# Patient Record
Sex: MALE | Race: BLACK OR AFRICAN AMERICAN | NOT HISPANIC OR LATINO | URBAN - METROPOLITAN AREA
[De-identification: names, ages, dates, MRNs, and addresses within clinical notes are randomized per-mention and may not be internally consistent; named-entity substitution may affect disease eponyms.]

---

## 2017-06-19 ENCOUNTER — INPATIENT (INPATIENT)
Facility: HOSPITAL | Age: 62
LOS: 4 days | Discharge: HOME | DRG: 287 | End: 2017-06-24
Attending: HOSPITALIST | Admitting: HOSPITALIST
Payer: COMMERCIAL

## 2017-06-19 VITALS
TEMPERATURE: 98 F | DIASTOLIC BLOOD PRESSURE: 65 MMHG | RESPIRATION RATE: 20 BRPM | HEIGHT: 69 IN | OXYGEN SATURATION: 100 % | HEART RATE: 109 BPM | WEIGHT: 162.92 LBS | SYSTOLIC BLOOD PRESSURE: 103 MMHG

## 2017-06-19 DIAGNOSIS — I48.91 UNSPECIFIED ATRIAL FIBRILLATION: ICD-10-CM

## 2017-06-19 DIAGNOSIS — I47.2 VENTRICULAR TACHYCARDIA: ICD-10-CM

## 2017-06-19 LAB
ALBUMIN SERPL ELPH-MCNC: 2.4 G/DL — LOW (ref 3.3–5.2)
ALP SERPL-CCNC: 38 U/L — LOW (ref 40–120)
ALT FLD-CCNC: 28 U/L — SIGNIFICANT CHANGE UP
ANION GAP SERPL CALC-SCNC: 10 MMOL/L — SIGNIFICANT CHANGE UP (ref 5–17)
ANISOCYTOSIS BLD QL: SLIGHT — SIGNIFICANT CHANGE UP
APTT BLD: 26.9 SEC — LOW (ref 27.5–37.4)
AST SERPL-CCNC: 16 U/L — SIGNIFICANT CHANGE UP
BILIRUB SERPL-MCNC: 0.3 MG/DL — LOW (ref 0.4–2)
BUN SERPL-MCNC: 15 MG/DL — SIGNIFICANT CHANGE UP (ref 8–20)
CALCIUM SERPL-MCNC: 7.5 MG/DL — LOW (ref 8.6–10.2)
CHLORIDE SERPL-SCNC: 98 MMOL/L — SIGNIFICANT CHANGE UP (ref 98–107)
CO2 SERPL-SCNC: 24 MMOL/L — SIGNIFICANT CHANGE UP (ref 22–29)
CREAT SERPL-MCNC: 0.88 MG/DL — SIGNIFICANT CHANGE UP (ref 0.5–1.3)
D DIMER BLD IA.RAPID-MCNC: 190 NG/ML DDU — SIGNIFICANT CHANGE UP
EOSINOPHIL NFR BLD AUTO: 3 % — SIGNIFICANT CHANGE UP (ref 0–6)
GLUCOSE SERPL-MCNC: 106 MG/DL — SIGNIFICANT CHANGE UP (ref 70–115)
HCT VFR BLD CALC: 29 % — LOW (ref 42–52)
HGB BLD-MCNC: 10.1 G/DL — LOW (ref 14–18)
HYPOCHROMIA BLD QL: SLIGHT — SIGNIFICANT CHANGE UP
INR BLD: 1.25 RATIO — HIGH (ref 0.88–1.16)
LYMPHOCYTES # BLD AUTO: 19 % — LOW (ref 20–55)
MACROCYTES BLD QL: SLIGHT — SIGNIFICANT CHANGE UP
MCHC RBC-ENTMCNC: 30.9 PG — SIGNIFICANT CHANGE UP (ref 27–31)
MCHC RBC-ENTMCNC: 34.8 G/DL — SIGNIFICANT CHANGE UP (ref 32–36)
MCV RBC AUTO: 88.7 FL — SIGNIFICANT CHANGE UP (ref 80–94)
MONOCYTES NFR BLD AUTO: 13 % — HIGH (ref 3–10)
NEUTROPHILS NFR BLD AUTO: 63 % — SIGNIFICANT CHANGE UP (ref 37–73)
NEUTS BAND # BLD: 2 % — SIGNIFICANT CHANGE UP (ref 0–8)
NT-PROBNP SERPL-SCNC: 1191 PG/ML — HIGH (ref 0–300)
PLAT MORPH BLD: NORMAL — SIGNIFICANT CHANGE UP
PLATELET # BLD AUTO: 78 K/UL — LOW (ref 150–400)
POIKILOCYTOSIS BLD QL AUTO: SLIGHT — SIGNIFICANT CHANGE UP
POTASSIUM SERPL-MCNC: 3.7 MMOL/L — SIGNIFICANT CHANGE UP (ref 3.5–5.3)
POTASSIUM SERPL-SCNC: 3.7 MMOL/L — SIGNIFICANT CHANGE UP (ref 3.5–5.3)
PROT SERPL-MCNC: 7.4 G/DL — SIGNIFICANT CHANGE UP (ref 6.6–8.7)
PROTHROM AB SERPL-ACNC: 13.8 SEC — HIGH (ref 9.8–12.7)
RBC # BLD: 3.27 M/UL — LOW (ref 4.6–6.2)
RBC # FLD: 16.6 % — HIGH (ref 11–15.6)
RBC BLD AUTO: ABNORMAL
SMUDGE CELLS # BLD: PRESENT — SIGNIFICANT CHANGE UP
SODIUM SERPL-SCNC: 132 MMOL/L — LOW (ref 135–145)
T4 AB SER-ACNC: 5.8 UG/DL — SIGNIFICANT CHANGE UP (ref 4.5–12)
TARGETS BLD QL SMEAR: SLIGHT — SIGNIFICANT CHANGE UP
TROPONIN T SERPL-MCNC: 0.12 NG/ML — HIGH (ref 0–0.06)
TROPONIN T SERPL-MCNC: 0.15 NG/ML — HIGH (ref 0–0.06)
TSH SERPL-MCNC: 2.91 UIU/ML — SIGNIFICANT CHANGE UP (ref 0.27–4.2)
WBC # BLD: 4.2 K/UL — LOW (ref 4.8–10.8)
WBC # FLD AUTO: 4.2 K/UL — LOW (ref 4.8–10.8)

## 2017-06-19 PROCEDURE — 93320 DOPPLER ECHO COMPLETE: CPT | Mod: 26

## 2017-06-19 PROCEDURE — 93010 ELECTROCARDIOGRAM REPORT: CPT

## 2017-06-19 PROCEDURE — 71010: CPT | Mod: 26

## 2017-06-19 PROCEDURE — 99291 CRITICAL CARE FIRST HOUR: CPT

## 2017-06-19 PROCEDURE — 99223 1ST HOSP IP/OBS HIGH 75: CPT

## 2017-06-19 PROCEDURE — 76376 3D RENDER W/INTRP POSTPROCES: CPT | Mod: 26

## 2017-06-19 PROCEDURE — 93312 ECHO TRANSESOPHAGEAL: CPT | Mod: 26

## 2017-06-19 PROCEDURE — 93325 DOPPLER ECHO COLOR FLOW MAPG: CPT | Mod: 26

## 2017-06-19 RX ORDER — GLUCAGON INJECTION, SOLUTION 0.5 MG/.1ML
1 INJECTION, SOLUTION SUBCUTANEOUS ONCE
Qty: 0 | Refills: 0 | Status: DISCONTINUED | OUTPATIENT
Start: 2017-06-19 | End: 2017-06-22

## 2017-06-19 RX ORDER — ENOXAPARIN SODIUM 100 MG/ML
70 INJECTION SUBCUTANEOUS ONCE
Qty: 0 | Refills: 0 | Status: COMPLETED | OUTPATIENT
Start: 2017-06-19 | End: 2017-06-19

## 2017-06-19 RX ORDER — WARFARIN SODIUM 2.5 MG/1
5 TABLET ORAL ONCE
Qty: 0 | Refills: 0 | Status: COMPLETED | OUTPATIENT
Start: 2017-06-19 | End: 2017-06-19

## 2017-06-19 RX ORDER — PANTOPRAZOLE SODIUM 20 MG/1
40 TABLET, DELAYED RELEASE ORAL
Qty: 0 | Refills: 0 | Status: DISCONTINUED | OUTPATIENT
Start: 2017-06-19 | End: 2017-06-24

## 2017-06-19 RX ORDER — POLYETHYLENE GLYCOL 3350 17 G/17G
17 POWDER, FOR SOLUTION ORAL DAILY
Qty: 0 | Refills: 0 | Status: DISCONTINUED | OUTPATIENT
Start: 2017-06-19 | End: 2017-06-24

## 2017-06-19 RX ORDER — DEXTROSE 50 % IN WATER 50 %
25 SYRINGE (ML) INTRAVENOUS ONCE
Qty: 0 | Refills: 0 | Status: DISCONTINUED | OUTPATIENT
Start: 2017-06-19 | End: 2017-06-22

## 2017-06-19 RX ORDER — SODIUM CHLORIDE 9 MG/ML
500 INJECTION INTRAMUSCULAR; INTRAVENOUS; SUBCUTANEOUS ONCE
Qty: 0 | Refills: 0 | Status: COMPLETED | OUTPATIENT
Start: 2017-06-19 | End: 2017-06-19

## 2017-06-19 RX ORDER — DEXTROSE 50 % IN WATER 50 %
1 SYRINGE (ML) INTRAVENOUS ONCE
Qty: 0 | Refills: 0 | Status: DISCONTINUED | OUTPATIENT
Start: 2017-06-19 | End: 2017-06-22

## 2017-06-19 RX ORDER — ACETAMINOPHEN 500 MG
650 TABLET ORAL ONCE
Qty: 0 | Refills: 0 | Status: COMPLETED | OUTPATIENT
Start: 2017-06-19 | End: 2017-06-19

## 2017-06-19 RX ORDER — DILTIAZEM HCL 120 MG
120 CAPSULE, EXT RELEASE 24 HR ORAL DAILY
Qty: 0 | Refills: 0 | Status: DISCONTINUED | OUTPATIENT
Start: 2017-06-19 | End: 2017-06-24

## 2017-06-19 RX ORDER — DILTIAZEM HCL 120 MG
10 CAPSULE, EXT RELEASE 24 HR ORAL
Qty: 125 | Refills: 0 | Status: DISCONTINUED | OUTPATIENT
Start: 2017-06-19 | End: 2017-06-19

## 2017-06-19 RX ORDER — HYDROCHLOROTHIAZIDE 25 MG
0 TABLET ORAL
Qty: 0 | Refills: 0 | COMMUNITY

## 2017-06-19 RX ORDER — PYRIDOXINE HCL (VITAMIN B6) 100 MG
50 TABLET ORAL DAILY
Qty: 0 | Refills: 0 | Status: DISCONTINUED | OUTPATIENT
Start: 2017-06-19 | End: 2017-06-24

## 2017-06-19 RX ORDER — METFORMIN HYDROCHLORIDE 850 MG/1
0 TABLET ORAL
Qty: 0 | Refills: 0 | COMMUNITY

## 2017-06-19 RX ORDER — LENALIDOMIDE 5 MG/1
0 CAPSULE ORAL
Qty: 0 | Refills: 0 | COMMUNITY

## 2017-06-19 RX ORDER — DEXTROSE 50 % IN WATER 50 %
12.5 SYRINGE (ML) INTRAVENOUS ONCE
Qty: 0 | Refills: 0 | Status: DISCONTINUED | OUTPATIENT
Start: 2017-06-19 | End: 2017-06-22

## 2017-06-19 RX ORDER — ENOXAPARIN SODIUM 100 MG/ML
80 INJECTION SUBCUTANEOUS ONCE
Qty: 0 | Refills: 0 | Status: DISCONTINUED | OUTPATIENT
Start: 2017-06-19 | End: 2017-06-19

## 2017-06-19 RX ORDER — SODIUM CHLORIDE 9 MG/ML
1000 INJECTION, SOLUTION INTRAVENOUS
Qty: 0 | Refills: 0 | Status: DISCONTINUED | OUTPATIENT
Start: 2017-06-19 | End: 2017-06-22

## 2017-06-19 RX ORDER — FUROSEMIDE 40 MG
20 TABLET ORAL ONCE
Qty: 0 | Refills: 0 | Status: COMPLETED | OUTPATIENT
Start: 2017-06-19 | End: 2017-06-19

## 2017-06-19 RX ORDER — DOCUSATE SODIUM 100 MG
100 CAPSULE ORAL THREE TIMES A DAY
Qty: 0 | Refills: 0 | Status: DISCONTINUED | OUTPATIENT
Start: 2017-06-19 | End: 2017-06-24

## 2017-06-19 RX ORDER — LOSARTAN POTASSIUM 100 MG/1
0 TABLET, FILM COATED ORAL
Qty: 0 | Refills: 0 | COMMUNITY

## 2017-06-19 RX ORDER — ENOXAPARIN SODIUM 100 MG/ML
70 INJECTION SUBCUTANEOUS EVERY 12 HOURS
Qty: 0 | Refills: 0 | Status: DISCONTINUED | OUTPATIENT
Start: 2017-06-19 | End: 2017-06-24

## 2017-06-19 RX ORDER — LOSARTAN POTASSIUM 100 MG/1
100 TABLET, FILM COATED ORAL DAILY
Qty: 0 | Refills: 0 | Status: DISCONTINUED | OUTPATIENT
Start: 2017-06-19 | End: 2017-06-24

## 2017-06-19 RX ORDER — INSULIN LISPRO 100/ML
VIAL (ML) SUBCUTANEOUS
Qty: 0 | Refills: 0 | Status: DISCONTINUED | OUTPATIENT
Start: 2017-06-19 | End: 2017-06-22

## 2017-06-19 RX ORDER — DILTIAZEM HCL 120 MG
5 CAPSULE, EXT RELEASE 24 HR ORAL
Qty: 125 | Refills: 0 | Status: DISCONTINUED | OUTPATIENT
Start: 2017-06-19 | End: 2017-06-19

## 2017-06-19 RX ORDER — SODIUM CHLORIDE 9 MG/ML
3 INJECTION INTRAMUSCULAR; INTRAVENOUS; SUBCUTANEOUS ONCE
Qty: 0 | Refills: 0 | Status: COMPLETED | OUTPATIENT
Start: 2017-06-19 | End: 2017-06-19

## 2017-06-19 RX ADMIN — Medication 650 MILLIGRAM(S): at 03:28

## 2017-06-19 RX ADMIN — SODIUM CHLORIDE 3 MILLILITER(S): 9 INJECTION INTRAMUSCULAR; INTRAVENOUS; SUBCUTANEOUS at 03:28

## 2017-06-19 RX ADMIN — Medication 20 MILLIGRAM(S): at 09:16

## 2017-06-19 RX ADMIN — ENOXAPARIN SODIUM 70 MILLIGRAM(S): 100 INJECTION SUBCUTANEOUS at 05:27

## 2017-06-19 RX ADMIN — ENOXAPARIN SODIUM 70 MILLIGRAM(S): 100 INJECTION SUBCUTANEOUS at 18:13

## 2017-06-19 RX ADMIN — Medication 50 MILLIGRAM(S): at 18:13

## 2017-06-19 RX ADMIN — WARFARIN SODIUM 5 MILLIGRAM(S): 2.5 TABLET ORAL at 21:38

## 2017-06-19 RX ADMIN — Medication: at 14:04

## 2017-06-19 RX ADMIN — Medication 5 MG/HR: at 05:39

## 2017-06-19 RX ADMIN — Medication 100 MILLIGRAM(S): at 21:38

## 2017-06-19 RX ADMIN — Medication 5 MG/HR: at 07:26

## 2017-06-19 RX ADMIN — Medication 10 MG/HR: at 09:12

## 2017-06-19 RX ADMIN — SODIUM CHLORIDE 500 MILLILITER(S): 9 INJECTION INTRAMUSCULAR; INTRAVENOUS; SUBCUTANEOUS at 03:33

## 2017-06-19 NOTE — ED PROVIDER NOTE - OBJECTIVE STATEMENT
A 63 yo M with a Hx of HTN and multiple myeloma complaining of palpitations x 1 day. Recent air travel from [place] to [place], followed by long drive to [place] and notes increased SOB. He began his first round of chemotherapy 14 days ago and is on coumadin prophylactically. No hx of DVT or PE. Pt denies hx of arrythmia. Other medications include decadron and glucagon in addition to chemotherapy medications. He reports associated nausea and states his mouth was quivering this morning. Pt denies vomiting, diarrhea, fevers, chills. NKDA.

## 2017-06-19 NOTE — ED PROVIDER NOTE - ATTENDING CONTRIBUTION TO CARE
I, Vasquez Isbell, performed the initial face to face bedside interview with this patient regarding history of present illness, review of symptoms and relevant past medical, social and family history.  I completed an independent physical examination.  I was the initial provider who evaluated this patient. I have signed out the follow up of any pending tests (i.e. labs, radiological studies) to the ACP.  I have communicated the patient’s plan of care and disposition with the ACP.  The history, relevant review of systems, past medical and surgical history, medical decision making, and physical examination was documented by the scribe in my presence and I attest to the accuracy of the documentation. I, Vasquez Isbell, performed the initial face to face bedside interview with this patient regarding history of present illness, review of symptoms and relevant past medical, social and family history.  I completed an independent physical examination.  I was the initial provider who evaluated this patient.    The history, relevant review of systems, past medical and surgical history, medical decision making, and physical examination was documented by the scribe in my presence and I attest to the accuracy of the documentation.

## 2017-06-19 NOTE — ED ADULT NURSE REASSESSMENT NOTE - NS ED NURSE REASSESS COMMENT FT1
pt c/o lightness and not feeling well, just after someone lifted up his bed so that he could eat,+ frequent yawning noted with jaw trembling / shaking noted. " I don't feel so good". v.s.s. f.s. 96mg/dl, apple juice provided, episode last about 1 minute the pt reports he feels good , tolerated lunch tray well. denies any other compliants at thias time.

## 2017-06-19 NOTE — ED ADULT NURSE REASSESSMENT NOTE - NS ED NURSE REASSESS COMMENT FT1
Patient A&OX3, back from DAREN. c/o mild right chest pain. VSS. CM in place. NSR. Safety Maintained. Patient A&OX3, back from DAREN and cardioversion. c/o mild right chest pain at this time. VSS. CM in place. NSR. Safety Maintained.

## 2017-06-19 NOTE — ED ADULT NURSE REASSESSMENT NOTE - NS ED NURSE REASSESS COMMENT FT1
spoke to Dr. Boogie, cardizem gtt discontinued at this time .to continue on po cardizem in am, DASH diet ordered, pt reports feeling better at this time, family updated on plan of care and questions answered.

## 2017-06-19 NOTE — CONSULT NOTE ADULT - PROBLEM SELECTOR RECOMMENDATION 2
Concern for underlying CM. For now, no changes in meds. Will add BNP to labs. Pulmonary congestion on chest X-ray. Will give lasix 20 mg IV.

## 2017-06-19 NOTE — ED ADULT NURSE REASSESSMENT NOTE - NS ED NURSE REASSESS COMMENT FT1
Patient received at 0700; awake; alert and oriented x4. c/o mild right chest pain at this time. Denies SOB, dizziness, and palpitation. No distress noted. VSS. Clear BBS. abd soft, nondistended and nontender. moving all extremities well. Respirations unlabored. Report received at bedside. Cardiac monitor in place. NSR. Call bell and personal items in reach. Continue to monitor patient and maintain safety. Patient received at 0700; awake; alert and oriented x4. c/o mild right chest pain at this time. Denies SOB, dizziness, and palpitation. No distress noted. VSS. Clear BBS. abd soft, nondistended and nontender. moving all extremities well. Respirations unlabored. Report received at bedside. Cardiac monitor in place. A fib on monitor. Call bell and personal items in reach. Continue to monitor patient and maintain safety.

## 2017-06-19 NOTE — H&P ADULT - ASSESSMENT
Patient is a  61y Male with history of hypertension, MM presenting with chest pain and palpitations. Patient has had difficulty sleeping at night. Presents with chest pain, right sided, no associated n/v/d.   Previous angiogram 5 years prior noted to be normal per patient with no PCI/CABG.   Palpitations- noted to be in afib with RVR. New onset. Has been on coumadin recently. 61y Male with history of hypertension, MM (on revlimid & coumadin x 14 days) presenting with chest pain, SOB and palpitations. Patient has had difficulty sleeping at night due to palp. Presents with chest pain, right sided, no associated n/v/d. Last night had left sided CP. Denies light headedness/dizziness, fevers/chills, abdominal pain, n/v, diarrhea/constipation, dysuria or increased urinary frequency. Previous angiogram 5 years prior noted to be normal per patient with no PCI/CABG. Noted to be in New onset afib with RVR. Pt didnt revert after 3 doses of  cardizem IVP, was started on Cardizem gtt & given Lovenox x 1 dose. INR 1.2, last INR check was 10 days ago, unsure of the result. Going for CV today    New onset afib with RVR- ?revlimid side effect was given cardizem 30 PO x 1, c/w cardizem gtt with titration per HR, mild congestion on CXR, F/U BNP, Echo, TnI x 3, NPO for CV today, c/w AC with bridging, TSH wnl  NSVT- r/o CM- f/u BNP, Echo Pulmonary congestion on chest X-ray, lasix 20 mg IV x 1 dose given  MM- revlimid last dose today after which to be started after 14 days.   HTN- c/w losartan, HCTZ  DM- hold metformin/ glimeperide, ISS, f/u FS  GERD- c/w PPI  DVT ppx on AC

## 2017-06-19 NOTE — ED ADULT TRIAGE NOTE - CHIEF COMPLAINT QUOTE
pt BIBA with complaints of palpations since yesterday, chest pain on and off with shortness of breath unable to sleep.

## 2017-06-19 NOTE — CONSULT NOTE ADULT - SUBJECTIVE AND OBJECTIVE BOX
[place] [facility]                                                        Office: [address] [address]                                                       Telephone: [phone]. Fax:[phone]      CC: Palpitations, shortness of breath    HPI: Patient is a  61y Male with history of hypertension, MM presenting with chest pain and palpitations. Patient has had difficulty sleeping at night. Presents with chest pain, right sided, no associated n/v/d.   Previous angiogram 5 years prior noted to be normal per patient with no PCI/CABG.   Palpitations- noted to be in afib with RVR. New onset. Has been on coumadin recently.     PAST MEDICAL & SURGICAL HISTORY:  HTN (hypertension)  No significant past surgical history    FAMILY HISTORY:none.     SOCIAL HISTORY: no EtOH, drugs or tobacco    MEDICATIONS  (STANDING):  diltiazem Infusion 5mG/Hr IV Continuous <Continuous>    ROS: All others negative    PHYSICAL EXAM:  Vital Signs Last 24 Hrs  T(C): 36.9, Max: 36.9 (06-19 @ 02:35)  T(F): 98.5, Max: 98.5 (06-19 @ 02:35)  HR: 86 (72 - 149)  BP: 117/74 (103/59 - 132/60)  BP(mean): --  RR: 20 (19 - 20)  SpO2: 97% (96% - 100%)  I&O's Summary    Appearance: Normal	  HEENT:   Normal oral mucosa, PERRL, EOMI	  Lymphatic: No lymphadenopathy  Cardiovascular: Normal S1 S2, No JVD, No murmurs, No edema  Respiratory: Lungs clear to auscultation	  Psychiatry: A & O x 3, Mood & affect appropriate  Gastrointestinal:  Soft, Non-tender, + BS	  Skin: No rashes, No ecchymoses, No cyanosis  Neurologic: Non-focal  Extremities: Normal range of motion, No clubbing, cyanosis or edema  Vascular: Peripheral pulses palpable 2+ bilaterally    ECG: afib with RVR. PVCs. Tele NSVT.   LABS:                        10.1   4.2   )-----------( 78       ( 19 Jun 2017 03:28 )             29.0     06-19    132<L>  |  98  |  15.0  ----------------------------<  106  3.7   |  24.0  |  0.88    Ca    7.5<L>      19 Jun 2017 05:14    TPro  7.4  /  Alb  2.4<L>  /  TBili  0.3<L>  /  DBili  x   /  AST  16  /  ALT  28  /  AlkPhos  38<L>  06-19    PT/INR - ( 19 Jun 2017 03:28 )   PT: 13.8 sec;   INR: 1.25 ratio         PTT - ( 19 Jun 2017 03:28 )  PTT:26.9 sec  CARDIAC MARKERS ( 19 Jun 2017 05:14 )  x     / 0.12 ng/mL / x     / x     / x          RADIOLOGY & ADDITIONAL STUDIES:

## 2017-06-19 NOTE — ED PROVIDER NOTE - MEDICAL DECISION MAKING DETAILS
Will check labs, CXR, Tylenol, IV bolus. If no improvement, will give Cardizem and consider ACS vs PE

## 2017-06-19 NOTE — PROGRESS NOTE ADULT - SUBJECTIVE AND OBJECTIVE BOX
TRANSESOPHAGEAL ECHOCARDIOGRAM     After risks and benefits of procedures were explained, informed consent was obtained and placed in chart.   The patient received topical anesthestic to the oropharynx with viscous lidocaine and benzocaine spray.  Refer to Anesthesia note for sedation details.  The DAREN probe was passed into the esophagus without difficulty.  Transesophageal and transgastric images were obtained.  The DAREN probe was removed without difficulty and examined.  There was no evidence for bleeding.  The patient tolerated the procedure well without any immediate DAREN-related complications.      Preliminary Findings:  No cardiac mass, vegetations, thrombus or shunts visualized.   No spontaneous echo contrast or thrombus in the LA/CARIDAD/RA/RAA.    CARIDAD systolic empyting velocities were normal  Overall LV systolic function was normal. Estimated LVEF =55-60%  RV systolic function was normal.  No significant valvular abnormality.    No pericardial effusion.   There was mild, non-mobile atheroma seen in the thoracic aorta.     Patient successfully converted to sinus rhythm with synchronized  150 J of direct current cardioversion. Now in sinus rhythm 60s.  Diltiazem gtt decreased.     Reinforced importance of compliance with anticoagulation with patient.      Final report to follow.

## 2017-06-19 NOTE — CONSULT NOTE ADULT - PROBLEM SELECTOR RECOMMENDATION 9
Difficult to rate control. Plan to increase cardizem to 10 mg. Given Lovenox this am. Plan for DAREN/CV today.   NPO.

## 2017-06-19 NOTE — ED PROVIDER NOTE - ENMT, MLM
Airway patent, Nasal mucosa clear. Parched mucous membranes. Throat has no vesicles, no oropharyngeal exudates and uvula is midline.

## 2017-06-19 NOTE — ED ADULT NURSE NOTE - OBJECTIVE STATEMENT
Received patient in B7L. A&ox3 , able to make needs known. As per patient, He started having chest pain and palpitations since yesterday. Patient states this is a side effect of a medication he is taking.

## 2017-06-20 DIAGNOSIS — I10 ESSENTIAL (PRIMARY) HYPERTENSION: ICD-10-CM

## 2017-06-20 LAB
ANION GAP SERPL CALC-SCNC: 11 MMOL/L — SIGNIFICANT CHANGE UP (ref 5–17)
ANISOCYTOSIS BLD QL: SLIGHT — SIGNIFICANT CHANGE UP
APTT BLD: 31.3 SEC — SIGNIFICANT CHANGE UP (ref 27.5–37.4)
BUN SERPL-MCNC: 15 MG/DL — SIGNIFICANT CHANGE UP (ref 8–20)
CALCIUM SERPL-MCNC: 7.2 MG/DL — LOW (ref 8.6–10.2)
CHLORIDE SERPL-SCNC: 100 MMOL/L — SIGNIFICANT CHANGE UP (ref 98–107)
CO2 SERPL-SCNC: 25 MMOL/L — SIGNIFICANT CHANGE UP (ref 22–29)
CREAT SERPL-MCNC: 0.93 MG/DL — SIGNIFICANT CHANGE UP (ref 0.5–1.3)
EOSINOPHIL NFR BLD AUTO: 4 % — SIGNIFICANT CHANGE UP (ref 0–6)
GLUCOSE SERPL-MCNC: 96 MG/DL — SIGNIFICANT CHANGE UP (ref 70–115)
HBA1C BLD-MCNC: 5.4 % — SIGNIFICANT CHANGE UP (ref 4–5.6)
HCT VFR BLD CALC: 27.5 % — LOW (ref 42–52)
HGB BLD-MCNC: 9.5 G/DL — LOW (ref 14–18)
HYPOCHROMIA BLD QL: SLIGHT — SIGNIFICANT CHANGE UP
INR BLD: 1.3 RATIO — HIGH (ref 0.88–1.16)
LYMPHOCYTES # BLD AUTO: 20 % — SIGNIFICANT CHANGE UP (ref 20–55)
MACROCYTES BLD QL: SLIGHT — SIGNIFICANT CHANGE UP
MAGNESIUM SERPL-MCNC: 1.9 MG/DL — SIGNIFICANT CHANGE UP (ref 1.6–2.6)
MCHC RBC-ENTMCNC: 31.1 PG — HIGH (ref 27–31)
MCHC RBC-ENTMCNC: 34.5 G/DL — SIGNIFICANT CHANGE UP (ref 32–36)
MCV RBC AUTO: 90.2 FL — SIGNIFICANT CHANGE UP (ref 80–94)
MONOCYTES NFR BLD AUTO: 18 % — HIGH (ref 3–10)
NEUTROPHILS NFR BLD AUTO: 56 % — SIGNIFICANT CHANGE UP (ref 37–73)
PHOSPHATE SERPL-MCNC: 2.6 MG/DL — SIGNIFICANT CHANGE UP (ref 2.4–4.7)
PLAT MORPH BLD: NORMAL — SIGNIFICANT CHANGE UP
PLATELET # BLD AUTO: 89 K/UL — LOW (ref 150–400)
POIKILOCYTOSIS BLD QL AUTO: SLIGHT — SIGNIFICANT CHANGE UP
POTASSIUM SERPL-MCNC: 3.4 MMOL/L — LOW (ref 3.5–5.3)
POTASSIUM SERPL-SCNC: 3.4 MMOL/L — LOW (ref 3.5–5.3)
PROTHROM AB SERPL-ACNC: 14.4 SEC — HIGH (ref 9.8–12.7)
RBC # BLD: 3.05 M/UL — LOW (ref 4.6–6.2)
RBC # FLD: 16.7 % — HIGH (ref 11–15.6)
RBC BLD AUTO: ABNORMAL
SODIUM SERPL-SCNC: 136 MMOL/L — SIGNIFICANT CHANGE UP (ref 135–145)
TROPONIN T SERPL-MCNC: 0.18 NG/ML — HIGH (ref 0–0.06)
VARIANT LYMPHS # BLD: 2 % — SIGNIFICANT CHANGE UP (ref 0–6)
WBC # BLD: 3.2 K/UL — LOW (ref 4.8–10.8)
WBC # FLD AUTO: 3.2 K/UL — LOW (ref 4.8–10.8)

## 2017-06-20 PROCEDURE — 99233 SBSQ HOSP IP/OBS HIGH 50: CPT

## 2017-06-20 PROCEDURE — 93010 ELECTROCARDIOGRAM REPORT: CPT

## 2017-06-20 RX ORDER — FUROSEMIDE 40 MG
20 TABLET ORAL ONCE
Qty: 0 | Refills: 0 | Status: COMPLETED | OUTPATIENT
Start: 2017-06-20 | End: 2017-06-20

## 2017-06-20 RX ORDER — AMIODARONE HYDROCHLORIDE 400 MG/1
1 TABLET ORAL
Qty: 900 | Refills: 0 | Status: DISCONTINUED | OUTPATIENT
Start: 2017-06-20 | End: 2017-06-21

## 2017-06-20 RX ORDER — POTASSIUM CHLORIDE 20 MEQ
40 PACKET (EA) ORAL ONCE
Qty: 0 | Refills: 0 | Status: COMPLETED | OUTPATIENT
Start: 2017-06-20 | End: 2017-06-20

## 2017-06-20 RX ORDER — AMIODARONE HYDROCHLORIDE 400 MG/1
0.03 TABLET ORAL
Qty: 900 | Refills: 0 | Status: DISCONTINUED | OUTPATIENT
Start: 2017-06-20 | End: 2017-06-20

## 2017-06-20 RX ORDER — AMIODARONE HYDROCHLORIDE 400 MG/1
0.5 TABLET ORAL
Qty: 900 | Refills: 0 | Status: DISCONTINUED | OUTPATIENT
Start: 2017-06-20 | End: 2017-06-21

## 2017-06-20 RX ORDER — AMIODARONE HYDROCHLORIDE 400 MG/1
0.01 TABLET ORAL
Qty: 900 | Refills: 0 | Status: DISCONTINUED | OUTPATIENT
Start: 2017-06-20 | End: 2017-06-20

## 2017-06-20 RX ORDER — AMIODARONE HYDROCHLORIDE 400 MG/1
150 TABLET ORAL ONCE
Qty: 0 | Refills: 0 | Status: COMPLETED | OUTPATIENT
Start: 2017-06-20 | End: 2017-06-20

## 2017-06-20 RX ORDER — WARFARIN SODIUM 2.5 MG/1
5 TABLET ORAL ONCE
Qty: 0 | Refills: 0 | Status: COMPLETED | OUTPATIENT
Start: 2017-06-20 | End: 2017-06-20

## 2017-06-20 RX ADMIN — PANTOPRAZOLE SODIUM 40 MILLIGRAM(S): 20 TABLET, DELAYED RELEASE ORAL at 06:01

## 2017-06-20 RX ADMIN — LOSARTAN POTASSIUM 100 MILLIGRAM(S): 100 TABLET, FILM COATED ORAL at 06:01

## 2017-06-20 RX ADMIN — AMIODARONE HYDROCHLORIDE 16.67 MG/MIN: 400 TABLET ORAL at 21:57

## 2017-06-20 RX ADMIN — POLYETHYLENE GLYCOL 3350 17 GRAM(S): 17 POWDER, FOR SOLUTION ORAL at 12:23

## 2017-06-20 RX ADMIN — AMIODARONE HYDROCHLORIDE 618 MILLIGRAM(S): 400 TABLET ORAL at 15:48

## 2017-06-20 RX ADMIN — Medication 50 MILLIGRAM(S): at 12:23

## 2017-06-20 RX ADMIN — Medication 120 MILLIGRAM(S): at 06:01

## 2017-06-20 RX ADMIN — ENOXAPARIN SODIUM 70 MILLIGRAM(S): 100 INJECTION SUBCUTANEOUS at 06:00

## 2017-06-20 RX ADMIN — Medication 40 MILLIEQUIVALENT(S): at 15:26

## 2017-06-20 RX ADMIN — WARFARIN SODIUM 5 MILLIGRAM(S): 2.5 TABLET ORAL at 23:12

## 2017-06-20 RX ADMIN — ENOXAPARIN SODIUM 70 MILLIGRAM(S): 100 INJECTION SUBCUTANEOUS at 18:09

## 2017-06-20 RX ADMIN — AMIODARONE HYDROCHLORIDE 33.33 MG/MIN: 400 TABLET ORAL at 16:12

## 2017-06-20 NOTE — PROGRESS NOTE ADULT - SUBJECTIVE AND OBJECTIVE BOX
CHIEF COMPLAINT/INTERVAL HISTORY:    Patient is a 61y old  Male who presents with a chief complaint of palpitations (19 Jun 2017 09:26)      HPI:  61y Male with history of hypertension, MM (on revlimid & coumadin x 14 days) presenting with chest pain, SOB and palpitations. Patient has had difficulty sleeping at night due to palp. Presents with chest pain, right sided, no associated n/v/d. Last night had left sided CP. Denies light headedness/dizziness, fevers/chills, abdominal pain, n/v, diarrhea/constipation, dysuria or increased urinary frequency. Previous angiogram 5 years prior noted to be normal per patient with no PCI/CABG. Noted to be in New onset afib with RVR. Pt was started on Cardizem gtt & given Lovenox x 1 dose. INR 1.2, last INR check was 10 days ago, unsure of the result. Going for CV today      Hemonc: Dr. Aman Hummel (19 Jun 2017 09:26)      SUBJECTIVE & OBJECTIVE: Pt seen and examined at bedside. COnverted to Afib with RVR at 160's. IV Cardizem given with not much improvement Amio load per cardio. c/o intermittent chest pain & SOB    ICU Vital Signs Last 24 Hrs  T(C): 37.6, Max: 37.6 (06-20 @ 15:28)  T(F): 99.6, Max: 99.6 (06-20 @ 15:28)  HR: 120 (69 - 130)  BP: 114/64 (101/56 - 125/69)  BP(mean): --  ABP: --  ABP(mean): --  RR: 15 (14 - 18)  SpO2: 99% (96% - 99%)        MEDICATIONS  (STANDING):  enoxaparin Injectable 70milliGRAM(s) SubCutaneous every 12 hours  losartan 100milliGRAM(s) Oral daily  hydrochlorothiazide   Tablet 25milliGRAM(s) Oral daily  pyridoxine 50milliGRAM(s) Oral daily  pantoprazole    Tablet 40milliGRAM(s) Oral before breakfast  insulin lispro (HumaLOG) corrective regimen sliding scale  SubCutaneous three times a day before meals  dextrose 5%. 1000milliLiter(s) IV Continuous <Continuous>  dextrose 50% Injectable 12.5Gram(s) IV Push once  dextrose 50% Injectable 25Gram(s) IV Push once  dextrose 50% Injectable 25Gram(s) IV Push once  docusate sodium 100milliGRAM(s) Oral three times a day  polyethylene glycol 3350 17Gram(s) Oral daily  diltiazem   CD 120milliGRAM(s) Oral daily  warfarin 5milliGRAM(s) Oral once  amiodarone IVPB 150milliGRAM(s) IV Intermittent once  amiodarone Infusion 1mG/Min IV Continuous <Continuous>  amiodarone Infusion 0.5mG/Min IV Continuous <Continuous>    MEDICATIONS  (PRN):  dextrose Gel 1Dose(s) Oral once PRN Blood Glucose LESS THAN 70 milliGRAM(s)/deciliter  glucagon  Injectable 1milliGRAM(s) IntraMuscular once PRN Glucose LESS THAN 70 milligrams/deciliter      LABS:                        9.5    3.2   )-----------( 89       ( 20 Jun 2017 02:38 )             27.5     06-20    136  |  100  |  15.0  ----------------------------<  96  3.4<L>   |  25.0  |  0.93    Ca    7.2<L>      20 Jun 2017 02:38  Phos  2.6     06-20  Mg     1.9     06-20    TPro  7.4  /  Alb  2.4<L>  /  TBili  0.3<L>  /  DBili  x   /  AST  16  /  ALT  28  /  AlkPhos  38<L>  06-19    PT/INR - ( 20 Jun 2017 02:38 )   PT: 14.4 sec;   INR: 1.30 ratio         PTT - ( 20 Jun 2017 02:38 )  PTT:31.3 sec      CAPILLARY BLOOD GLUCOSE  94 (20 Jun 2017 12:16)  99 (20 Jun 2017 08:36)  97 (19 Jun 2017 21:43)  133 (19 Jun 2017 18:08)      RECENT CULTURES:      RADIOLOGY & ADDITIONAL TESTS:      PHYSICAL EXAM:    GENERAL: NAD, well-groomed, well-developed  HEAD:  Atraumatic, Normocephalic  EYES: EOMI, PERRLA, conjunctiva and sclera clear  ENMT: Moist mucous membranes  NECK: Supple, No JVD  NERVOUS SYSTEM:  Alert & Oriented X3, Motor Strength 5/5 B/L upper and lower extremities; DTRs 2+ intact and symmetric  CHEST/LUNG: Clear to auscultation bilaterally; No rales, rhonchi, wheezing, or rubs  HEART: IRR; No murmurs, rubs, or gallops  ABDOMEN: Soft, Nontender, Nondistended; Bowel sounds present  EXTREMITIES:  2+ Peripheral Pulses, No clubbing, cyanosis, or edema

## 2017-06-20 NOTE — PROVIDER CONTACT NOTE (CHANGE IN STATUS NOTIFICATION) - ACTION/TREATMENT ORDERED:
Made Tamy Little Np aware who placed an order for Cardizem 10 mg IVP, stat EKG, and amiodarone drip orders. Pt currently in bed no s/s of distress will follow orders and continue to monitor.

## 2017-06-20 NOTE — PROGRESS NOTE ADULT - PROBLEM SELECTOR PLAN 2
Stable.   Abnormal troponins- ? of related to afib with RVR. Non specific t wave abnormality. Per patient and family, had coronary angiogram a few years prior and noted to be normal. Do not think any coronary evaluation is needed at this time. LVEF normal.

## 2017-06-20 NOTE — PROVIDER CONTACT NOTE (CHANGE IN STATUS NOTIFICATION) - SITUATION
Pt converted back to AFIB. Pt c/o palpitations but no chest pain, chest tightness, or SOB. /70, -160.

## 2017-06-20 NOTE — PROGRESS NOTE ADULT - SUBJECTIVE AND OBJECTIVE BOX
[place] [facility]                                                        Office: [address] [address]                                                       Telephone: [phone]. Fax:[phone]          CC: palpitations.      INTERVAL HISTORY: Patient had recurrent afib episodes. Started on amiodarone drip. Now stable.     MEDICATIONS  (STANDING):  enoxaparin Injectable 70milliGRAM(s) SubCutaneous every 12 hours  losartan 100milliGRAM(s) Oral daily  hydrochlorothiazide   Tablet 25milliGRAM(s) Oral daily  pyridoxine 50milliGRAM(s) Oral daily  pantoprazole    Tablet 40milliGRAM(s) Oral before breakfast  insulin lispro (HumaLOG) corrective regimen sliding scale  SubCutaneous three times a day before meals  dextrose 5%. 1000milliLiter(s) IV Continuous <Continuous>  dextrose 50% Injectable 12.5Gram(s) IV Push once  dextrose 50% Injectable 25Gram(s) IV Push once  dextrose 50% Injectable 25Gram(s) IV Push once  docusate sodium 100milliGRAM(s) Oral three times a day  polyethylene glycol 3350 17Gram(s) Oral daily  diltiazem   CD 120milliGRAM(s) Oral daily  warfarin 5milliGRAM(s) Oral once  amiodarone Infusion 1mG/Min IV Continuous <Continuous>  amiodarone Infusion 0.5mG/Min IV Continuous <Continuous>    ROS: All others negative     PHYSICAL EXAM:  T(C): 37, Max: 37.6 (06-20 @ 15:28)  HR: 84 (69 - 130)  BP: 110/66 (101/56 - 125/69)  RR: 15 (14 - 18)  SpO2: 99% (96% - 99%)  Wt(kg): --  I&O's Summary  I & Os for 24h ending 20 Jun 2017 07:00  =============================================  IN: 0 ml / OUT: 350 ml / NET: -350 ml    I & Os for current day (as of 20 Jun 2017 18:43)  =============================================  IN: 0 ml / OUT: 175 ml / NET: -175 ml      Appearance: Normal	  HEENT:   Normal oral mucosa, PERRL, EOMI	  Lymphatic: No lymphadenopathy  Cardiovascular: Normal S1 S2, No JVD, No murmurs, No edema  Respiratory: Lungs clear to auscultation	  Psychiatry: A & O x 3, Mood & affect appropriate  Gastrointestinal:  Soft, Non-tender, + BS	  Skin: No rashes, No ecchymoses, No cyanosis  Neurologic: Non-focal  Extremities: Normal range of motion, No clubbing, cyanosis or edema  Vascular: Peripheral pulses palpable 2+ bilaterally    TELEMETRY: 	 afib rate 140s.      LABS:	 	                        9.5    3.2   )-----------( 89       ( 20 Jun 2017 02:38 )             27.5     06-20    136  |  100  |  15.0  ----------------------------<  96  3.4<L>   |  25.0  |  0.93    Ca    7.2<L>      20 Jun 2017 02:38  Phos  2.6     06-20  Mg     1.9     06-20    TPro  7.4  /  Alb  2.4<L>  /  TBili  0.3<L>  /  DBili  x   /  AST  16  /  ALT  28  /  AlkPhos  38<L>  06-19

## 2017-06-20 NOTE — PROGRESS NOTE ADULT - SUBJECTIVE AND OBJECTIVE BOX
Chief Complaint:  complaints of palpiations      Assessment:  on monitor converted back to rapid afib, rate up to 130, slight relief with 10 Cardizem no chest pain, no sob, no coughing, no pain in legs, no diaphoresis, spoke with MD Castellanos       Plan:  and recomended IV bolus of amio and then continue with a drip, also spoke with MD CASTILLO

## 2017-06-21 LAB
ANION GAP SERPL CALC-SCNC: 12 MMOL/L — SIGNIFICANT CHANGE UP (ref 5–17)
ANION GAP SERPL CALC-SCNC: 14 MMOL/L — SIGNIFICANT CHANGE UP (ref 5–17)
ANISOCYTOSIS BLD QL: SLIGHT — SIGNIFICANT CHANGE UP
APTT BLD: 30.7 SEC — SIGNIFICANT CHANGE UP (ref 27.5–37.4)
BASOPHILS # BLD AUTO: 0 K/UL — SIGNIFICANT CHANGE UP (ref 0–0.2)
BASOPHILS NFR BLD AUTO: 1 % — SIGNIFICANT CHANGE UP (ref 0–2)
BUN SERPL-MCNC: 13 MG/DL — SIGNIFICANT CHANGE UP (ref 8–20)
BUN SERPL-MCNC: 13 MG/DL — SIGNIFICANT CHANGE UP (ref 8–20)
CALCIUM SERPL-MCNC: 7.2 MG/DL — LOW (ref 8.6–10.2)
CALCIUM SERPL-MCNC: 7.2 MG/DL — LOW (ref 8.6–10.2)
CHLORIDE SERPL-SCNC: 96 MMOL/L — LOW (ref 98–107)
CHLORIDE SERPL-SCNC: 97 MMOL/L — LOW (ref 98–107)
CK SERPL-CCNC: 60 U/L — SIGNIFICANT CHANGE UP (ref 30–200)
CO2 SERPL-SCNC: 23 MMOL/L — SIGNIFICANT CHANGE UP (ref 22–29)
CO2 SERPL-SCNC: 23 MMOL/L — SIGNIFICANT CHANGE UP (ref 22–29)
CREAT SERPL-MCNC: 0.87 MG/DL — SIGNIFICANT CHANGE UP (ref 0.5–1.3)
CREAT SERPL-MCNC: 1 MG/DL — SIGNIFICANT CHANGE UP (ref 0.5–1.3)
EOSINOPHIL # BLD AUTO: 0.2 K/UL — SIGNIFICANT CHANGE UP (ref 0–0.5)
EOSINOPHIL NFR BLD AUTO: 4 % — SIGNIFICANT CHANGE UP (ref 0–6)
GLUCOSE SERPL-MCNC: 104 MG/DL — SIGNIFICANT CHANGE UP (ref 70–115)
GLUCOSE SERPL-MCNC: 89 MG/DL — SIGNIFICANT CHANGE UP (ref 70–115)
HCT VFR BLD CALC: 30.2 % — LOW (ref 42–52)
HGB BLD-MCNC: 10.5 G/DL — LOW (ref 14–18)
INR BLD: 1.35 RATIO — HIGH (ref 0.88–1.16)
LYMPHOCYTES # BLD AUTO: 1.2 K/UL — SIGNIFICANT CHANGE UP (ref 1–4.8)
LYMPHOCYTES # BLD AUTO: 30 % — SIGNIFICANT CHANGE UP (ref 20–55)
MACROCYTES BLD QL: SLIGHT — SIGNIFICANT CHANGE UP
MAGNESIUM SERPL-MCNC: 1.9 MG/DL — SIGNIFICANT CHANGE UP (ref 1.6–2.6)
MAGNESIUM SERPL-MCNC: 2 MG/DL — SIGNIFICANT CHANGE UP (ref 1.6–2.6)
MCHC RBC-ENTMCNC: 31.6 PG — HIGH (ref 27–31)
MCHC RBC-ENTMCNC: 34.8 G/DL — SIGNIFICANT CHANGE UP (ref 32–36)
MCV RBC AUTO: 91 FL — SIGNIFICANT CHANGE UP (ref 80–94)
MICROCYTES BLD QL: SLIGHT — SIGNIFICANT CHANGE UP
MONOCYTES # BLD AUTO: 0.5 K/UL — SIGNIFICANT CHANGE UP (ref 0–0.8)
MONOCYTES NFR BLD AUTO: 12 % — HIGH (ref 3–10)
NEUTROPHILS # BLD AUTO: 1.9 K/UL — SIGNIFICANT CHANGE UP (ref 1.8–8)
NEUTROPHILS NFR BLD AUTO: 50 % — SIGNIFICANT CHANGE UP (ref 37–73)
NEUTS BAND # BLD: 1 % — SIGNIFICANT CHANGE UP (ref 0–8)
PHOSPHATE SERPL-MCNC: 2.2 MG/DL — LOW (ref 2.4–4.7)
PHOSPHATE SERPL-MCNC: 2.5 MG/DL — SIGNIFICANT CHANGE UP (ref 2.4–4.7)
PLAT MORPH BLD: NORMAL — SIGNIFICANT CHANGE UP
PLATELET # BLD AUTO: 141 K/UL — LOW (ref 150–400)
POIKILOCYTOSIS BLD QL AUTO: SLIGHT — SIGNIFICANT CHANGE UP
POLYCHROMASIA BLD QL SMEAR: SLIGHT — SIGNIFICANT CHANGE UP
POTASSIUM SERPL-MCNC: 3.1 MMOL/L — LOW (ref 3.5–5.3)
POTASSIUM SERPL-MCNC: 3.4 MMOL/L — LOW (ref 3.5–5.3)
POTASSIUM SERPL-SCNC: 3.1 MMOL/L — LOW (ref 3.5–5.3)
POTASSIUM SERPL-SCNC: 3.4 MMOL/L — LOW (ref 3.5–5.3)
PROTHROM AB SERPL-ACNC: 14.9 SEC — HIGH (ref 9.8–12.7)
RBC # BLD: 3.32 M/UL — LOW (ref 4.6–6.2)
RBC # FLD: 17.6 % — HIGH (ref 11–15.6)
RBC BLD AUTO: ABNORMAL
SODIUM SERPL-SCNC: 131 MMOL/L — LOW (ref 135–145)
SODIUM SERPL-SCNC: 134 MMOL/L — LOW (ref 135–145)
TROPONIN T SERPL-MCNC: 0.2 NG/ML — HIGH (ref 0–0.06)
VARIANT LYMPHS # BLD: 2 % — SIGNIFICANT CHANGE UP (ref 0–6)
WBC # BLD: 3.7 K/UL — LOW (ref 4.8–10.8)
WBC # FLD AUTO: 3.7 K/UL — LOW (ref 4.8–10.8)

## 2017-06-21 PROCEDURE — 93010 ELECTROCARDIOGRAM REPORT: CPT

## 2017-06-21 PROCEDURE — 93010 ELECTROCARDIOGRAM REPORT: CPT | Mod: 77

## 2017-06-21 PROCEDURE — 71275 CT ANGIOGRAPHY CHEST: CPT | Mod: 26

## 2017-06-21 PROCEDURE — 99233 SBSQ HOSP IP/OBS HIGH 50: CPT

## 2017-06-21 RX ORDER — SODIUM CHLORIDE 9 MG/ML
500 INJECTION INTRAMUSCULAR; INTRAVENOUS; SUBCUTANEOUS ONCE
Qty: 0 | Refills: 0 | Status: COMPLETED | OUTPATIENT
Start: 2017-06-21 | End: 2017-06-21

## 2017-06-21 RX ORDER — METOPROLOL TARTRATE 50 MG
5 TABLET ORAL EVERY 6 HOURS
Qty: 0 | Refills: 0 | Status: DISCONTINUED | OUTPATIENT
Start: 2017-06-21 | End: 2017-06-24

## 2017-06-21 RX ORDER — ONDANSETRON 8 MG/1
4 TABLET, FILM COATED ORAL ONCE
Qty: 0 | Refills: 0 | Status: COMPLETED | OUTPATIENT
Start: 2017-06-21 | End: 2017-06-21

## 2017-06-21 RX ORDER — WARFARIN SODIUM 2.5 MG/1
7 TABLET ORAL ONCE
Qty: 0 | Refills: 0 | Status: DISCONTINUED | OUTPATIENT
Start: 2017-06-21 | End: 2017-06-21

## 2017-06-21 RX ORDER — DIGOXIN 250 MCG
0.5 TABLET ORAL ONCE
Qty: 0 | Refills: 0 | Status: COMPLETED | OUTPATIENT
Start: 2017-06-21 | End: 2017-06-21

## 2017-06-21 RX ORDER — POTASSIUM CHLORIDE 20 MEQ
10 PACKET (EA) ORAL
Qty: 0 | Refills: 0 | Status: COMPLETED | OUTPATIENT
Start: 2017-06-21 | End: 2017-06-21

## 2017-06-21 RX ORDER — DIGOXIN 250 MCG
0.25 TABLET ORAL ONCE
Qty: 0 | Refills: 0 | Status: COMPLETED | OUTPATIENT
Start: 2017-06-22 | End: 2017-06-22

## 2017-06-21 RX ORDER — POTASSIUM CHLORIDE 20 MEQ
20 PACKET (EA) ORAL
Qty: 0 | Refills: 0 | Status: COMPLETED | OUTPATIENT
Start: 2017-06-21 | End: 2017-06-22

## 2017-06-21 RX ORDER — AMIODARONE HYDROCHLORIDE 400 MG/1
400 TABLET ORAL EVERY 8 HOURS
Qty: 0 | Refills: 0 | Status: DISCONTINUED | OUTPATIENT
Start: 2017-06-21 | End: 2017-06-24

## 2017-06-21 RX ORDER — DILTIAZEM HCL 120 MG
5 CAPSULE, EXT RELEASE 24 HR ORAL
Qty: 125 | Refills: 0 | Status: DISCONTINUED | OUTPATIENT
Start: 2017-06-21 | End: 2017-06-21

## 2017-06-21 RX ORDER — MAGNESIUM SULFATE 500 MG/ML
1 VIAL (ML) INJECTION ONCE
Qty: 0 | Refills: 0 | Status: COMPLETED | OUTPATIENT
Start: 2017-06-21 | End: 2017-06-21

## 2017-06-21 RX ORDER — DIGOXIN 250 MCG
0.25 TABLET ORAL DAILY
Qty: 0 | Refills: 0 | Status: DISCONTINUED | OUTPATIENT
Start: 2017-06-23 | End: 2017-06-24

## 2017-06-21 RX ORDER — SODIUM,POTASSIUM PHOSPHATES 278-250MG
1 POWDER IN PACKET (EA) ORAL
Qty: 0 | Refills: 0 | Status: COMPLETED | OUTPATIENT
Start: 2017-06-21 | End: 2017-06-22

## 2017-06-21 RX ADMIN — AMIODARONE HYDROCHLORIDE 400 MILLIGRAM(S): 400 TABLET ORAL at 21:48

## 2017-06-21 RX ADMIN — Medication 50 MILLIGRAM(S): at 11:43

## 2017-06-21 RX ADMIN — LOSARTAN POTASSIUM 100 MILLIGRAM(S): 100 TABLET, FILM COATED ORAL at 05:47

## 2017-06-21 RX ADMIN — AMIODARONE HYDROCHLORIDE 400 MILLIGRAM(S): 400 TABLET ORAL at 14:47

## 2017-06-21 RX ADMIN — ENOXAPARIN SODIUM 70 MILLIGRAM(S): 100 INJECTION SUBCUTANEOUS at 21:49

## 2017-06-21 RX ADMIN — Medication 100 MILLIGRAM(S): at 15:56

## 2017-06-21 RX ADMIN — Medication 100 GRAM(S): at 23:14

## 2017-06-21 RX ADMIN — Medication 1 TABLET(S): at 21:49

## 2017-06-21 RX ADMIN — SODIUM CHLORIDE 1000 MILLILITER(S): 9 INJECTION INTRAMUSCULAR; INTRAVENOUS; SUBCUTANEOUS at 17:30

## 2017-06-21 RX ADMIN — PANTOPRAZOLE SODIUM 40 MILLIGRAM(S): 20 TABLET, DELAYED RELEASE ORAL at 05:47

## 2017-06-21 RX ADMIN — Medication 100 MILLIGRAM(S): at 05:47

## 2017-06-21 RX ADMIN — Medication 100 MILLIEQUIVALENT(S): at 21:48

## 2017-06-21 RX ADMIN — Medication 0.5 MILLIGRAM(S): at 18:51

## 2017-06-21 RX ADMIN — ONDANSETRON 4 MILLIGRAM(S): 8 TABLET, FILM COATED ORAL at 18:16

## 2017-06-21 RX ADMIN — ENOXAPARIN SODIUM 70 MILLIGRAM(S): 100 INJECTION SUBCUTANEOUS at 11:43

## 2017-06-21 RX ADMIN — POLYETHYLENE GLYCOL 3350 17 GRAM(S): 17 POWDER, FOR SOLUTION ORAL at 11:43

## 2017-06-21 RX ADMIN — Medication 20 MILLIEQUIVALENT(S): at 23:14

## 2017-06-21 RX ADMIN — Medication 5 MG/HR: at 15:52

## 2017-06-21 RX ADMIN — Medication 120 MILLIGRAM(S): at 05:47

## 2017-06-21 NOTE — CONSULT NOTE ADULT - ASSESSMENT
61y Male with history of hypertension, MM (on revlimid & coumadin x 14 days) visitng form [place] ws in [place] visitng family when he developed presenting with chest pain, SOB and palpitations. Patient has had difficulty sleeping at night due to palp. Presents with chest pain, right sided, no associated n/v/d. Last night had left sided CP. Denies light headedness/dizziness, fevers/chills, abdominal pain, n/v, diarrhea/constipation, dysuria or increased urinary frequency. Previous angiogram 5 years prior noted to be normal per patient with no PCI/CABG. Noted to be in New onset afib with RVR s/p DAREN DCCV 6/19/2017 here at [facility] the was started on Cardizem gtt & given Lovenox for subtherapeutic  INR .  He is planning to return to [place] later this week. he is currently receiving Amiodarone PO loading for ERAF.  He denies lifelong syncope orthopnea (+) [place] (+) LE edema (-) PND  Co-morbid illness; (-) for DM , DVT, CVA, PE, MI  bleeding or clotting disorders. He denies prior cardiac work-up.

## 2017-06-21 NOTE — PROGRESS NOTE ADULT - SUBJECTIVE AND OBJECTIVE BOX
[place] [facility]                                                        Office: [address] [address]                                                       Telephone: [phone]. Fax:[phone]          CC: SHortness of breath    INTERVAL HISTORY: Patient s/p another episode of rapid afib. Now hypotensive. Given IV fluids.     MEDICATIONS  (STANDING):  enoxaparin Injectable 70milliGRAM(s) SubCutaneous every 12 hours  losartan 100milliGRAM(s) Oral daily  hydrochlorothiazide   Tablet 25milliGRAM(s) Oral daily  pyridoxine 50milliGRAM(s) Oral daily  pantoprazole    Tablet 40milliGRAM(s) Oral before breakfast  insulin lispro (HumaLOG) corrective regimen sliding scale  SubCutaneous three times a day before meals  dextrose 5%. 1000milliLiter(s) IV Continuous <Continuous>  dextrose 50% Injectable 12.5Gram(s) IV Push once  dextrose 50% Injectable 25Gram(s) IV Push once  dextrose 50% Injectable 25Gram(s) IV Push once  docusate sodium 100milliGRAM(s) Oral three times a day  polyethylene glycol 3350 17Gram(s) Oral daily  diltiazem   CD 120milliGRAM(s) Oral daily  potassium acid phosphate/sodium acid phosphate tablet (K-PHOS No. 2) 1Tablet(s) Oral four times a day with meals  amiodarone    Tablet 400milliGRAM(s) Oral every 8 hours    ROS: All others negative     PHYSICAL EXAM:  T(C): 37, Max: 37.6 (06-20 @ 22:40)  HR: 152 (75 - 152)  BP: 110/60 (108/60 - 118/66)  RR: 16 (15 - 16)  SpO2: 95% (95% - 98%)  Wt(kg): --  I&O's Summary  I & Os for 24h ending 21 Jun 2017 07:00  =============================================  IN: 369.9 ml / OUT: 925 ml / NET: -555.1 ml    I & Os for current day (as of 21 Jun 2017 18:23)  =============================================  IN: 273.2 ml / OUT: 500 ml / NET: -226.8 ml      Appearance: Normal	  HEENT:   Normal oral mucosa, PERRL, EOMI	  Lymphatic: No lymphadenopathy  Cardiovascular: Normal S1 S2, No JVD, No murmurs, No edema  Respiratory: Lungs clear to auscultation	  Psychiatry: A & O x 3, Mood & affect appropriate  Gastrointestinal:  Soft, Non-tender, + BS	  Skin: No rashes, No ecchymoses, No cyanosis  Neurologic: Non-focal  Extremities: Normal range of motion, No clubbing, cyanosis or edema  Vascular: Peripheral pulses palpable 2+ bilaterally    TELEMETRY: 	 rapid afib 130s.      LABS:	 	                        10.5   3.7   )-----------( 141      ( 21 Jun 2017 07:17 )             30.2     06-21    131<L>  |  96<L>  |  13.0  ----------------------------<  89  3.4<L>   |  23.0  |  1.00    Ca    7.2<L>      21 Jun 2017 07:17  Phos  2.2     06-21  Mg     2.0     06-21

## 2017-06-21 NOTE — CHART NOTE - NSCHARTNOTEFT_GEN_A_CORE
Rapid Response called for hypotension and near syncope    61y Male with history of hypertension, MM on Revlimid, Afib on coumadin Admitted for chest pain found to have Afib with RVR. Patient had cardioversion on 6/19/17 and returned to Afib. S/p Amiodarone IV drip load which was discontinued now on Amiodarone PO and started on Cardizem drip today.   Patient was seen at bedside, anxious appearing. States that he felt like he was going to pass out. Cardizem was discontinued.     VS:   BP - 70/40  HR - 70  O2 sat - 99%    Gen: Anxious appearing male   HEENT: NCAT, BREANA, no JVD  CVS: Irregular rate and rhythm, no appreciable murmurs  Lungs: CTAB  Ext: no pedal edema noted.  Neuro: AAOx3, no focal deficits    A/P:  Presyncope and hypotension likely secondary to intolerance to cardizem drip.   -Patient started on Cardizem Drip at 1500, patient unable to tolerate and it was discontinued  -IVF 500cc bolus given and blood pressure improved to 118/62  -EKG shows atrial fibrillation and Atrial flutter  -Monitor review and 30 mins prior to event shows PVCs, flutter and afib  -BMP and Troponin ordered  -Case discussed with Cardiology Dr Cordova, agreeable with plan to discontinue cardizem drip, agrees with above plan.   Recommends to give lopressor 5mg IVP PRN for rate sustained >130  If no response to restart amiodarone drip.     Case Discussed with Dr Jose who agrees with management.   Cardiac PA also aware of case and will follow electrolytes  SROC Dr Salcedo present at bedside. Rapid Response called for hypotension and near syncope    61y Male with history of hypertension, MM on Revlimid, Afib on coumadin Admitted for chest pain found to have Afib with RVR. Patient had cardioversion on 6/19/17 and returned to Afib. S/p Amiodarone IV drip load which was discontinued now on Amiodarone PO and started on Cardizem drip today.   Patient was seen at bedside, anxious appearing. States that he felt like he was going to pass out. Cardizem was discontinued.   No falls, chest pain, nausea, vomiting, sob or any edema.    VS:   BP - 70/40  HR - 70  O2 sat - 99%    Gen: Anxious appearing male   HEENT: NCAT, BREANA, no JVD  CVS: Irregular rate and rhythm, no appreciable murmurs  Lungs: CTAB  Ext: no pedal edema noted.  Neuro: AAOx3, no focal deficits    A/P:  Presyncope and hypotension likely secondary to intolerance to cardizem drip.   -Patient started on Cardizem Drip at 1500, patient unable to tolerate and it was discontinued  -IVF 500cc bolus given and blood pressure improved to 118/62  -EKG shows atrial fibrillation and Atrial flutter  -Monitor review and 30 mins prior to event shows PVCs, flutter and afib  -BMP and Troponin ordered  -Case discussed with Cardiology Dr Cordova, agreeable with plan to discontinue cardizem drip, agrees with above plan.   Recommends to give lopressor 5mg IVP PRN for rate sustained >130  If no response to restart amiodarone drip.     Case Discussed with Dr Jose who agrees with management.   Cardiac PA also aware of case and will follow electrolytes  SROC Dr Salcedo present at bedside.

## 2017-06-21 NOTE — PROGRESS NOTE ADULT - SUBJECTIVE AND OBJECTIVE BOX
CHIEF COMPLAINT/INTERVAL HISTORY:    Patient is a 61y old  Male who presents with a chief complaint of palpitations (19 Jun 2017 09:26)      HPI:  61y Male with history of hypertension, MM (on revlimid & coumadin x 14 days) presenting with chest pain, SOB and palpitations. Patient has had difficulty sleeping at night due to palp. Presents with chest pain, right sided, no associated n/v/d. Last night had left sided CP. Denies light headedness/dizziness, fevers/chills, abdominal pain, n/v, diarrhea/constipation, dysuria or increased urinary frequency. Previous angiogram 5 years prior noted to be normal per patient with no PCI/CABG. Noted to be in New onset afib with RVR. Pt was started on Cardizem gtt & given Lovenox x 1 dose. INR 1.2, last INR check was 10 days ago, unsure of the result. Going for CV today      Hemonc: Dr. Aman Hummel (19 Jun 2017 09:26)      SUBJECTIVE & OBJECTIVE: Pt seen and examined at bedside. Pt with NSR overnight, now back to Afib since noon. Pt with similar CP, SOB, lightheadedness as before.     ICU Vital Signs Last 24 Hrs  T(C): 37, Max: 37.6 (06-20 @ 22:40)  T(F): 98.6, Max: 99.6 (06-20 @ 22:40)  HR: 152 (75 - 152)  BP: 110/60 (108/60 - 118/66)  BP(mean): --  ABP: --  ABP(mean): --  RR: 16 (15 - 16)  SpO2: 95% (95% - 99%)        MEDICATIONS  (STANDING):  enoxaparin Injectable 70milliGRAM(s) SubCutaneous every 12 hours  losartan 100milliGRAM(s) Oral daily  hydrochlorothiazide   Tablet 25milliGRAM(s) Oral daily  pyridoxine 50milliGRAM(s) Oral daily  pantoprazole    Tablet 40milliGRAM(s) Oral before breakfast  insulin lispro (HumaLOG) corrective regimen sliding scale  SubCutaneous three times a day before meals  dextrose 5%. 1000milliLiter(s) IV Continuous <Continuous>  dextrose 50% Injectable 12.5Gram(s) IV Push once  dextrose 50% Injectable 25Gram(s) IV Push once  dextrose 50% Injectable 25Gram(s) IV Push once  docusate sodium 100milliGRAM(s) Oral three times a day  polyethylene glycol 3350 17Gram(s) Oral daily  diltiazem   CD 120milliGRAM(s) Oral daily  warfarin 7milliGRAM(s) Oral once  potassium acid phosphate/sodium acid phosphate tablet (K-PHOS No. 2) 1Tablet(s) Oral four times a day with meals  amiodarone    Tablet 400milliGRAM(s) Oral every 8 hours  diltiazem Infusion 5mG/Hr IV Continuous <Continuous>    MEDICATIONS  (PRN):  dextrose Gel 1Dose(s) Oral once PRN Blood Glucose LESS THAN 70 milliGRAM(s)/deciliter  glucagon  Injectable 1milliGRAM(s) IntraMuscular once PRN Glucose LESS THAN 70 milligrams/deciliter      LABS:                        10.5   3.7   )-----------( 141      ( 21 Jun 2017 07:17 )             30.2     06-21    131<L>  |  96<L>  |  13.0  ----------------------------<  89  3.4<L>   |  23.0  |  1.00    Ca    7.2<L>      21 Jun 2017 07:17  Phos  2.2     06-21  Mg     2.0     06-21      PT/INR - ( 21 Jun 2017 07:17 )   PT: 14.9 sec;   INR: 1.35 ratio         PTT - ( 21 Jun 2017 07:17 )  PTT:30.7 sec      CAPILLARY BLOOD GLUCOSE  88 (21 Jun 2017 13:14)  80 (21 Jun 2017 08:10)  111 (20 Jun 2017 23:15)  101 (20 Jun 2017 17:00)      RECENT CULTURES:      RADIOLOGY & ADDITIONAL TESTS:      PHYSICAL EXAM:    GENERAL: NAD, well-groomed, well-developed  HEAD:  Atraumatic, Normocephalic  EYES: EOMI, PERRLA, conjunctiva and sclera clear  ENMT: Moist mucous membranes  NECK: Supple, No JVD  NERVOUS SYSTEM:  Alert & Oriented X3, Motor Strength 5/5 B/L upper and lower extremities; DTRs 2+ intact and symmetric  CHEST/LUNG: Clear to auscultation bilaterally; No rales, rhonchi, wheezing, or rubs  HEART: IRR; No murmurs, rubs, or gallops  ABDOMEN: Soft, Nontender, Nondistended; Bowel sounds present  EXTREMITIES:  2+ Peripheral Pulses, No clubbing, cyanosis, or edema

## 2017-06-21 NOTE — PROGRESS NOTE ADULT - PROBLEM SELECTOR PLAN 1
Now on po amiodarone. Will start digoxin 0.5 IV x1 now then 0.25 6 hours later and then 0.25 mg 6 hours later.   CTA to rule out PE.   Cath tomorrow to rule CAD.   Hold coumadin.   If recurrent episodes of rapid afib, can start amiodarone IV.   Patient's oncologist would prefer coumadin for now.

## 2017-06-21 NOTE — CONSULT NOTE ADULT - PROBLEM SELECTOR RECOMMENDATION 9
Amiodarone PO load, cRCL wnl : agree with Digoxin load, observe on tel for bradycardia , check TSH prn IV Dilt 2.5-5mg as bp tolerates.  Lovenox --> Coumadin.  will review 12lead if predominant Atrial flutter and rhythm remains refractory to drug therapy may consider EPS/ablation.  Consider CT /PE protocol r/o PE

## 2017-06-21 NOTE — CONSULT NOTE ADULT - SUBJECTIVE AND OBJECTIVE BOX
Patient is a 61y old  Male who presents with a chief complaint of palpitations (2017 09:26)      HPI:  61y Male with history of hypertension, MM (on revlimid & coumadin x 14 days) visitng form [place] ws in [place] visitng family when he developed presenting with chest pain, SOB and palpitations. Patient has had difficulty sleeping at night due to palp. Presents with chest pain, right sided, no associated n/v/d. Last night had left sided CP. Denies light headedness/dizziness, fevers/chills, abdominal pain, n/v, diarrhea/constipation, dysuria or increased urinary frequency. Previous angiogram 5 years prior noted to be normal per patient with no PCI/CABG. Noted to be in New onset afib with RVR s/p DAREN DCCV 2017 here at [facility] the was started on Cardizem gtt & given Lovenox for subtherapeutic  INR .  He is planning to return to [place] later this week. he is currently receiving Amiodarone PO loading for ERAF.  He denies lifelong syncope orthopnea (+) MCC[place]LL[place] (+) LE edema (-) PND  Co-morbid illness; (-) for DM , DVT, CVA, PE, MI  bleeding or clotting disorders. He denies prior cardiac work-up.      PAST MEDICAL & SURGICAL HISTORY:  Multiple myeloma, remission status unspecified  HTN (hypertension)  No significant past surgical history      PREVIOUS DIAGNOSTIC TESTING:      ECHO  FINDINGS: Summary:   1. Normal biventricular systolic function. Visually estimated LVEF =   60-65%.   2. No significant valvular abnormalities.   3. No intracardiac thrombus or intracardiac shunt present.   4. No significant pericardial effusion.   5. Mild, non-mobile atheroma in the thoracic aorta.   6. Successful synchronized DCCV to sinus rhythm with 150 Joules.   7. ** No prior echocardiograms available for comparison.        Allergies    No Known Allergies    Intolerances        MEDICATIONS  (STANDING):  enoxaparin Injectable 70milliGRAM(s) SubCutaneous every 12 hours  losartan 100milliGRAM(s) Oral daily  hydrochlorothiazide   Tablet 25milliGRAM(s) Oral daily  pyridoxine 50milliGRAM(s) Oral daily  pantoprazole    Tablet 40milliGRAM(s) Oral before breakfast  insulin lispro (HumaLOG) corrective regimen sliding scale  SubCutaneous three times a day before meals  dextrose 5%. 1000milliLiter(s) IV Continuous <Continuous>  dextrose 50% Injectable 12.5Gram(s) IV Push once  dextrose 50% Injectable 25Gram(s) IV Push once  dextrose 50% Injectable 25Gram(s) IV Push once  docusate sodium 100milliGRAM(s) Oral three times a day  polyethylene glycol 3350 17Gram(s) Oral daily  diltiazem   CD 120milliGRAM(s) Oral daily  potassium acid phosphate/sodium acid phosphate tablet (K-PHOS No. 2) 1Tablet(s) Oral four times a day with meals  amiodarone    Tablet 400milliGRAM(s) Oral every 8 hours    MEDICATIONS  (PRN):  dextrose Gel 1Dose(s) Oral once PRN Blood Glucose LESS THAN 70 milliGRAM(s)/deciliter  glucagon  Injectable 1milliGRAM(s) IntraMuscular once PRN Glucose LESS THAN 70 milligrams/deciliter  metoprolol Injectable 5milliGRAM(s) IV Push every 6 hours PRN Sustained HR > 130s      FAMILY HISTORY:  No pertinent family history in first degree relatives      SOCIAL HISTORY :Lives with [facility] Hospital[facility]    CIGARETTES:denies    ALCOHOL:denies    REVIEW OF SYSTEMS:  CONSTITUTIONAL: No fever, weight loss, or fatigue  EYES: No eye pain, visual disturbances, or discharge  ENMT:  No difficulty hearing, tinnitus, vertigo; No sinus or throat pain  NECK: No pain or stiffness  RESPIRATORY: No cough, wheezing, chills or hemoptysis; No Shortness of Breath  CARDIOVASCULAR: No chest pain, palpitations, passing out, dizziness, or leg swelling  GASTROINTESTINAL: No abdominal or epigastric pain. No nausea, vomiting, or hematemesis; No diarrhea or constipation. No melena or hematochezia.  GENITOURINARY: No dysuria, frequency, hematuria, or incontinence  NEUROLOGICAL: No headaches, memory loss, loss of strength, numbness, or tremors  SKIN: No itching, burning, rashes, or lesions   LYMPH Nodes: No enlarged glands  ENDOCRINE: No heat or cold intolerance; No hair loss  MUSCULOSKELETAL: No joint pain or swelling; No muscle, back, or extremity pain  PSYCHIATRIC: No depression, anxiety, mood swings, or difficulty sleeping  HEME/LYMPH: No easy bruising, or bleeding gums  ALLERY AND IMMUNOLOGIC: No hives or eczema	    Vital Signs Last 24 Hrs  T(C): 37, Max: 37.6 (06-20 @ 22:40)  T(F): 98.6, Max: 99.6 ( @ 22:40)  HR: 152 (75 - 152)  BP: 110/60 (108/60 - 118/66)  BP(mean): --  RR: 16 (15 - 16)  SpO2: 95% (95% - 98%)    Daily     Daily Weight in k.4 (2017 06:46)    I&O's Detail  I & Os for 24h ending 2017 07:00  =============================================  IN:    amiodarone Infusion: 249.9 ml    Oral Fluid: 120 ml    Total IN: 369.9 ml  ---------------------------------------------  OUT:    Voided: 925 ml    Total OUT: 925 ml  ---------------------------------------------  Total NET: -555.1 ml    I & Os for current day (as of 2017 19:54)  =============================================  IN:    Oral Fluid: 840 ml    amiodarone Infusion: 33.2 ml    diltiazem Infusion: 15 ml    Total IN: 888.2 ml  ---------------------------------------------  OUT:    Voided: 750 ml    Total OUT: 750 ml  ---------------------------------------------  Total NET: 138.2 ml      PHYSICAL EXAM:  Appearance: Normal, well nourished	  HEENT:   Normal oral mucosa, PERRL, EOMI, sclera non-icteric	  Lymphatic: No cervical lymphadenopathy  Cardiovascular: Normal S1 S2, No JVD, No cardiac murmurs, No carotid bruits, No peripheral edema  Respiratory: Lungs clear to auscultation	  Psychiatry: A & O x 3, Mood & affect appropriate  Gastrointestinal:  Soft, Non-tender, + BS, no bruits	  Skin: No rashes, No ecchymoses, No cyanosis  Neurologic: Grossly non-focal with full strength in all four extremities  Extremities: Normal range of motion, No clubbing, cyanosis or edema  Vascular: Peripheral pulses palpable 2+ bilaterally      INTERPRETATION OF TELEMETRY:    ECG: SR no acute injury or ischemic pattern    LABS:                        10.5   3.7   )-----------( 141      ( 2017 07:17 )             30.2     06-21    134<L>  |  97<L>  |  13.0  ----------------------------<  104  3.1<L>   |  23.0  |  0.87    Ca    7.2<L>      2017 18:22  Phos  2.5     06-21  Mg     1.9     06-21      CARDIAC MARKERS ( 2017 18:22 )  x     / 0.20 ng/mL / 60 U/L / x     / x      CARDIAC MARKERS ( 2017 02:38 )  x     / 0.18 ng/mL / x     / x     / x          PT/INR - ( 2017 07:17 )   PT: 14.9 sec;   INR: 1.35 ratio         PTT - ( 2017 07:17 )  PTT:30.7 sec    I&O's Summary  I & Os for 24h ending 2017 07:00  =============================================  IN: 369.9 ml / OUT: 925 ml / NET: -555.1 ml    I & Os for current day (as of 2017 19:54)  =============================================  IN: 888.2 ml / OUT: 750 ml / NET: 138.2 ml    BNP  RADIOLOGY & ADDITIONAL STUDIES:

## 2017-06-22 LAB
ANION GAP SERPL CALC-SCNC: 10 MMOL/L — SIGNIFICANT CHANGE UP (ref 5–17)
ANISOCYTOSIS BLD QL: SLIGHT — SIGNIFICANT CHANGE UP
APTT BLD: 37.2 SEC — SIGNIFICANT CHANGE UP (ref 27.5–37.4)
BASOPHILS # BLD AUTO: 0.1 K/UL — SIGNIFICANT CHANGE UP (ref 0–0.2)
BASOPHILS NFR BLD AUTO: 1 % — SIGNIFICANT CHANGE UP (ref 0–2)
BUN SERPL-MCNC: 13 MG/DL — SIGNIFICANT CHANGE UP (ref 8–20)
CALCIUM SERPL-MCNC: 7.4 MG/DL — LOW (ref 8.6–10.2)
CHLORIDE SERPL-SCNC: 103 MMOL/L — SIGNIFICANT CHANGE UP (ref 98–107)
CO2 SERPL-SCNC: 24 MMOL/L — SIGNIFICANT CHANGE UP (ref 22–29)
CREAT SERPL-MCNC: 1.08 MG/DL — SIGNIFICANT CHANGE UP (ref 0.5–1.3)
EOSINOPHIL # BLD AUTO: 0.1 K/UL — SIGNIFICANT CHANGE UP (ref 0–0.5)
EOSINOPHIL NFR BLD AUTO: 4 % — SIGNIFICANT CHANGE UP (ref 0–6)
GLUCOSE SERPL-MCNC: 90 MG/DL — SIGNIFICANT CHANGE UP (ref 70–115)
HCT VFR BLD CALC: 30.2 % — LOW (ref 42–52)
HGB BLD-MCNC: 10.3 G/DL — LOW (ref 14–18)
HYPOCHROMIA BLD QL: SLIGHT — SIGNIFICANT CHANGE UP
INR BLD: 1.62 RATIO — HIGH (ref 0.88–1.16)
LYMPHOCYTES # BLD AUTO: 1.3 K/UL — SIGNIFICANT CHANGE UP (ref 1–4.8)
LYMPHOCYTES # BLD AUTO: 37 % — SIGNIFICANT CHANGE UP (ref 20–55)
MAGNESIUM SERPL-MCNC: 2.2 MG/DL — SIGNIFICANT CHANGE UP (ref 1.6–2.6)
MCHC RBC-ENTMCNC: 31.2 PG — HIGH (ref 27–31)
MCHC RBC-ENTMCNC: 34.1 G/DL — SIGNIFICANT CHANGE UP (ref 32–36)
MCV RBC AUTO: 91.5 FL — SIGNIFICANT CHANGE UP (ref 80–94)
MONOCYTES # BLD AUTO: 0.7 K/UL — SIGNIFICANT CHANGE UP (ref 0–0.8)
MONOCYTES NFR BLD AUTO: 16 % — HIGH (ref 3–10)
NEUTROPHILS # BLD AUTO: 1.4 K/UL — LOW (ref 1.8–8)
NEUTROPHILS NFR BLD AUTO: 41 % — SIGNIFICANT CHANGE UP (ref 37–73)
NEUTS BAND # BLD: 1 % — SIGNIFICANT CHANGE UP (ref 0–8)
OVALOCYTES BLD QL SMEAR: SLIGHT — SIGNIFICANT CHANGE UP
PHOSPHATE SERPL-MCNC: 3.2 MG/DL — SIGNIFICANT CHANGE UP (ref 2.4–4.7)
PLAT MORPH BLD: NORMAL — SIGNIFICANT CHANGE UP
PLATELET # BLD AUTO: 188 K/UL — SIGNIFICANT CHANGE UP (ref 150–400)
POIKILOCYTOSIS BLD QL AUTO: SLIGHT — SIGNIFICANT CHANGE UP
POLYCHROMASIA BLD QL SMEAR: SLIGHT — SIGNIFICANT CHANGE UP
POTASSIUM SERPL-MCNC: 4.3 MMOL/L — SIGNIFICANT CHANGE UP (ref 3.5–5.3)
POTASSIUM SERPL-SCNC: 4.3 MMOL/L — SIGNIFICANT CHANGE UP (ref 3.5–5.3)
PROTHROM AB SERPL-ACNC: 18 SEC — HIGH (ref 9.8–12.7)
RBC # BLD: 3.3 M/UL — LOW (ref 4.6–6.2)
RBC # FLD: 17.5 % — HIGH (ref 11–15.6)
RBC BLD AUTO: ABNORMAL
SODIUM SERPL-SCNC: 137 MMOL/L — SIGNIFICANT CHANGE UP (ref 135–145)
TARGETS BLD QL SMEAR: SLIGHT — SIGNIFICANT CHANGE UP
WBC # BLD: 3.7 K/UL — LOW (ref 4.8–10.8)
WBC # FLD AUTO: 3.7 K/UL — LOW (ref 4.8–10.8)

## 2017-06-22 PROCEDURE — 99232 SBSQ HOSP IP/OBS MODERATE 35: CPT

## 2017-06-22 PROCEDURE — 93010 ELECTROCARDIOGRAM REPORT: CPT

## 2017-06-22 PROCEDURE — 93454 CORONARY ARTERY ANGIO S&I: CPT | Mod: 26

## 2017-06-22 PROCEDURE — 99233 SBSQ HOSP IP/OBS HIGH 50: CPT

## 2017-06-22 RX ORDER — GLUCAGON INJECTION, SOLUTION 0.5 MG/.1ML
1 INJECTION, SOLUTION SUBCUTANEOUS ONCE
Qty: 0 | Refills: 0 | Status: DISCONTINUED | OUTPATIENT
Start: 2017-06-22 | End: 2017-06-24

## 2017-06-22 RX ORDER — ZOLPIDEM TARTRATE 10 MG/1
5 TABLET ORAL ONCE
Qty: 0 | Refills: 0 | Status: DISCONTINUED | OUTPATIENT
Start: 2017-06-22 | End: 2017-06-22

## 2017-06-22 RX ORDER — DEXTROSE 50 % IN WATER 50 %
1 SYRINGE (ML) INTRAVENOUS ONCE
Qty: 0 | Refills: 0 | Status: DISCONTINUED | OUTPATIENT
Start: 2017-06-22 | End: 2017-06-24

## 2017-06-22 RX ORDER — ALPRAZOLAM 0.25 MG
0.25 TABLET ORAL ONCE
Qty: 0 | Refills: 0 | Status: DISCONTINUED | OUTPATIENT
Start: 2017-06-22 | End: 2017-06-22

## 2017-06-22 RX ORDER — DEXTROSE 50 % IN WATER 50 %
12.5 SYRINGE (ML) INTRAVENOUS ONCE
Qty: 0 | Refills: 0 | Status: DISCONTINUED | OUTPATIENT
Start: 2017-06-22 | End: 2017-06-24

## 2017-06-22 RX ORDER — ALPRAZOLAM 0.25 MG
0.25 TABLET ORAL ONCE
Qty: 0 | Refills: 0 | Status: DISCONTINUED | OUTPATIENT
Start: 2017-06-22 | End: 2017-06-23

## 2017-06-22 RX ORDER — DEXTROSE 50 % IN WATER 50 %
25 SYRINGE (ML) INTRAVENOUS ONCE
Qty: 0 | Refills: 0 | Status: DISCONTINUED | OUTPATIENT
Start: 2017-06-22 | End: 2017-06-24

## 2017-06-22 RX ORDER — SENNA PLUS 8.6 MG/1
2 TABLET ORAL AT BEDTIME
Qty: 0 | Refills: 0 | Status: DISCONTINUED | OUTPATIENT
Start: 2017-06-22 | End: 2017-06-24

## 2017-06-22 RX ORDER — INSULIN LISPRO 100/ML
VIAL (ML) SUBCUTANEOUS
Qty: 0 | Refills: 0 | Status: DISCONTINUED | OUTPATIENT
Start: 2017-06-22 | End: 2017-06-24

## 2017-06-22 RX ORDER — SODIUM CHLORIDE 9 MG/ML
1000 INJECTION, SOLUTION INTRAVENOUS
Qty: 0 | Refills: 0 | Status: DISCONTINUED | OUTPATIENT
Start: 2017-06-22 | End: 2017-06-24

## 2017-06-22 RX ORDER — SODIUM CHLORIDE 9 MG/ML
1000 INJECTION INTRAMUSCULAR; INTRAVENOUS; SUBCUTANEOUS
Qty: 0 | Refills: 0 | Status: DISCONTINUED | OUTPATIENT
Start: 2017-06-22 | End: 2017-06-24

## 2017-06-22 RX ORDER — WARFARIN SODIUM 2.5 MG/1
5 TABLET ORAL ONCE
Qty: 0 | Refills: 0 | Status: COMPLETED | OUTPATIENT
Start: 2017-06-22 | End: 2017-06-22

## 2017-06-22 RX ORDER — ASPIRIN/CALCIUM CARB/MAGNESIUM 324 MG
325 TABLET ORAL ONCE
Qty: 0 | Refills: 0 | Status: COMPLETED | OUTPATIENT
Start: 2017-06-22 | End: 2017-06-22

## 2017-06-22 RX ADMIN — AMIODARONE HYDROCHLORIDE 400 MILLIGRAM(S): 400 TABLET ORAL at 06:27

## 2017-06-22 RX ADMIN — ENOXAPARIN SODIUM 70 MILLIGRAM(S): 100 INJECTION SUBCUTANEOUS at 21:51

## 2017-06-22 RX ADMIN — SENNA PLUS 2 TABLET(S): 8.6 TABLET ORAL at 21:51

## 2017-06-22 RX ADMIN — Medication 1 TABLET(S): at 11:51

## 2017-06-22 RX ADMIN — Medication 0.25 MILLIGRAM(S): at 01:24

## 2017-06-22 RX ADMIN — AMIODARONE HYDROCHLORIDE 400 MILLIGRAM(S): 400 TABLET ORAL at 16:30

## 2017-06-22 RX ADMIN — Medication 50 MILLIGRAM(S): at 11:45

## 2017-06-22 RX ADMIN — PANTOPRAZOLE SODIUM 40 MILLIGRAM(S): 20 TABLET, DELAYED RELEASE ORAL at 05:42

## 2017-06-22 RX ADMIN — Medication 20 MILLIEQUIVALENT(S): at 00:48

## 2017-06-22 RX ADMIN — ZOLPIDEM TARTRATE 5 MILLIGRAM(S): 10 TABLET ORAL at 00:44

## 2017-06-22 RX ADMIN — Medication 1 TABLET(S): at 10:21

## 2017-06-22 RX ADMIN — Medication 100 MILLIGRAM(S): at 05:42

## 2017-06-22 RX ADMIN — AMIODARONE HYDROCHLORIDE 400 MILLIGRAM(S): 400 TABLET ORAL at 23:25

## 2017-06-22 RX ADMIN — Medication 325 MILLIGRAM(S): at 14:20

## 2017-06-22 RX ADMIN — Medication 100 MILLIGRAM(S): at 21:51

## 2017-06-22 RX ADMIN — LOSARTAN POTASSIUM 100 MILLIGRAM(S): 100 TABLET, FILM COATED ORAL at 05:42

## 2017-06-22 RX ADMIN — WARFARIN SODIUM 5 MILLIGRAM(S): 2.5 TABLET ORAL at 21:51

## 2017-06-22 NOTE — PROGRESS NOTE ADULT - SUBJECTIVE AND OBJECTIVE BOX
CHIEF COMPLAINT/INTERVAL HISTORY:    Patient is a 61y old  Male who presents with a chief complaint of palpitations (19 Jun 2017 09:26)      HPI:  61y Male with history of hypertension, MM (on revlimid & coumadin x 14 days) presenting with chest pain, SOB and palpitations. Patient has had difficulty sleeping at night due to palp. Presents with chest pain, right sided, no associated n/v/d. Last night had left sided CP. Denies light headedness/dizziness, fevers/chills, abdominal pain, n/v, diarrhea/constipation, dysuria or increased urinary frequency. Previous angiogram 5 years prior noted to be normal per patient with no PCI/CABG. Noted to be in New onset afib with RVR. Pt was started on Cardizem gtt & given Lovenox x 1 dose. INR 1.2, last INR check was 10 days ago, unsure of the result. Going for CV today      Hemonc: Dr. Aman Hummel (19 Jun 2017 09:26)      SUBJECTIVE & OBJECTIVE: Pt seen and examined at bedside. Feels a lot better today. Denies any CP, palp, SOB, lightheadedness. Events noted since last evening. Afib --->NSR.  Currently in NSR in 70's. BP stable.     ICU Vital Signs Last 24 Hrs  T(C): 36.8, Max: 37.1 (06-22 @ 05:41)  T(F): 98.3, Max: 98.7 (06-22 @ 05:41)  HR: 77 (38 - 118)  BP: 123/79 (70/40 - 125/62)  BP(mean): --  ABP: --  ABP(mean): --  RR: 16 (16 - 16)  SpO2: 98% (95% - 100%)        MEDICATIONS  (STANDING):  enoxaparin Injectable 70milliGRAM(s) SubCutaneous every 12 hours  losartan 100milliGRAM(s) Oral daily  hydrochlorothiazide   Tablet 25milliGRAM(s) Oral daily  pyridoxine 50milliGRAM(s) Oral daily  pantoprazole    Tablet 40milliGRAM(s) Oral before breakfast  insulin lispro (HumaLOG) corrective regimen sliding scale  SubCutaneous three times a day before meals  dextrose 5%. 1000milliLiter(s) IV Continuous <Continuous>  dextrose 50% Injectable 12.5Gram(s) IV Push once  dextrose 50% Injectable 25Gram(s) IV Push once  dextrose 50% Injectable 25Gram(s) IV Push once  docusate sodium 100milliGRAM(s) Oral three times a day  polyethylene glycol 3350 17Gram(s) Oral daily  diltiazem   CD 120milliGRAM(s) Oral daily  amiodarone    Tablet 400milliGRAM(s) Oral every 8 hours  artificial  tears Solution 1Drop(s) Both EYES two times a day  senna 2Tablet(s) Oral at bedtime  sodium chloride 0.9%. 1000milliLiter(s) IV Continuous <Continuous>    MEDICATIONS  (PRN):  dextrose Gel 1Dose(s) Oral once PRN Blood Glucose LESS THAN 70 milliGRAM(s)/deciliter  glucagon  Injectable 1milliGRAM(s) IntraMuscular once PRN Glucose LESS THAN 70 milligrams/deciliter  metoprolol Injectable 5milliGRAM(s) IV Push every 6 hours PRN Sustained HR > 130s      LABS:                        10.3   3.7   )-----------( 188      ( 22 Jun 2017 06:36 )             30.2     06-22    137  |  103  |  13.0  ----------------------------<  90  4.3   |  24.0  |  1.08    Ca    7.4<L>      22 Jun 2017 06:36  Phos  3.2     06-22  Mg     2.2     06-22      PT/INR - ( 22 Jun 2017 06:36 )   PT: 18.0 sec;   INR: 1.62 ratio         PTT - ( 22 Jun 2017 06:36 )  PTT:37.2 sec      CAPILLARY BLOOD GLUCOSE  90 (22 Jun 2017 08:12)  159 (21 Jun 2017 21:45)      RECENT CULTURES:      RADIOLOGY & ADDITIONAL TESTS:      PHYSICAL EXAM:    GENERAL: NAD, well-groomed, well-developed  HEAD:  Atraumatic, Normocephalic  EYES: EOMI, PERRLA, conjunctiva and sclera clear  ENMT: Moist mucous membranes  NECK: Supple, No JVD  NERVOUS SYSTEM:  Alert & Oriented X3, Motor Strength 5/5 B/L upper and lower extremities; DTRs 2+ intact and symmetric  CHEST/LUNG: Clear to auscultation bilaterally; No rales, rhonchi, wheezing, or rubs  HEART: IRR; No murmurs, rubs, or gallops  ABDOMEN: Soft, Nontender, Nondistended; Bowel sounds present  EXTREMITIES:  2+ Peripheral Pulses, No clubbing, cyanosis, or edema

## 2017-06-22 NOTE — PROGRESS NOTE ADULT - SUBJECTIVE AND OBJECTIVE BOX
Nurse Practitioner Progress note:     INTERVAL HISTORY: New on set Afib DCCV to NSR    MEDICATIONS:  losartan 100milliGRAM(s) Oral daily  hydrochlorothiazide   Tablet 25milliGRAM(s) Oral daily  diltiazem   CD 120milliGRAM(s) Oral daily  metoprolol Injectable 5milliGRAM(s) IV Push every 6 hours PRN  amiodarone    Tablet 400milliGRAM(s) Oral every 8 hours          pantoprazole    Tablet 40milliGRAM(s) Oral before breakfast  docusate sodium 100milliGRAM(s) Oral three times a day  polyethylene glycol 3350 17Gram(s) Oral daily  senna 2Tablet(s) Oral at bedtime    insulin lispro (HumaLOG) corrective regimen sliding scale  SubCutaneous three times a day before meals  dextrose Gel 1Dose(s) Oral once PRN  dextrose 50% Injectable 12.5Gram(s) IV Push once  dextrose 50% Injectable 25Gram(s) IV Push once  dextrose 50% Injectable 25Gram(s) IV Push once  glucagon  Injectable 1milliGRAM(s) IntraMuscular once PRN    enoxaparin Injectable 70milliGRAM(s) SubCutaneous every 12 hours  pyridoxine 50milliGRAM(s) Oral daily  dextrose 5%. 1000milliLiter(s) IV Continuous <Continuous>  artificial  tears Solution 1Drop(s) Both EYES two times a day  sodium chloride 0.9%. 1000milliLiter(s) IV Continuous <Continuous>      TELEMETRY: NSR 81 bpm    T(C): 36.8, Max: 37.1 (06-22 @ 05:41)  HR: 83 (38 - 118)  BP: 120/70 (70/40 - 125/62)  RR: 16 (16 - 16)  SpO2: 100% (95% - 100%)    PHYSICAL EXAM:  Appearance: Normal	  Cardiovascular: Normal S1 S2, No JVD, No murmurs, No edema  Respiratory: Lungs clear to auscultation	  Neurologic: Non-focal, A&O X3.  No neuro deficits  Procedure Site: Right radial band in place.  Site benign.  No bleeding/hematoma/ecchymosis.  + palp radial pulse      PROCEDURE RESULTS: S/P LHC which revealed on CAD via right radial    ASSESSMENT/PLAN: 	  -Radial precaution  -D/C radial band in 1 hour  -Resume meds  -Follow up with Dr. Aguilera  -Please call with questions concerns

## 2017-06-22 NOTE — PROGRESS NOTE ADULT - SUBJECTIVE AND OBJECTIVE BOX
NPO since breakfast  Mallampati 2 ASA 2  lytic lesion thoracic vertebrae noted  Ca+7.4 noted and Dr. gAuilera aware  ASA 325mg po x1 given  IVFs ordered NPO since breakfast  Mallampati 2 ASA 2  lytic lesion thoracic vertebrae noted  Ca+7.4 noted and Dr. Aguilera aware  ASA 325mg po x1 given  IVFs ordered

## 2017-06-22 NOTE — PROGRESS NOTE ADULT - SUBJECTIVE AND OBJECTIVE BOX
HPI:  61y Male with history of hypertension, MM (on revlimid & coumadin x 14 days) presenting with chest pain, SOB and palpitations. Patient has had difficulty sleeping at night due to palp. Presents with chest pain, right sided, no associated n/v/d. Last night had left sided CP. Denies light headedness/dizziness, fevers/chills, abdominal pain, n/v, diarrhea/constipation, dysuria or increased urinary frequency. Previous angiogram 5 years prior noted to be normal per patient with no PCI/CABG. Noted to be in New onset afib with RVR. Pt was started on Cardizem gtt & given Lovenox x 1 dose. INR 1.2, last INR check was 10 days ago, unsure of the result.  Successful cardioverted to sinus then converted back to afib with RVR and started on amio drio in addition to Cardizem.  Yesterday:  rapid afib with RVR and had a syncopal episode while sitting, added Dig this am.    Today patient seen with wife at side, stable, SR no ectopy on monitor, PVC"s and 3 b NSVT.   .  Scheduled for cardiac cath at about 5pm.  NPO after a light lunch.      CT PE : negative for Pulmonary Emboli  IMPRESSION:   1.  No pulmonary embolism.  2.  Diffuse lytic osseous lesions with age indeterminant mild to moderate   compression deformity of the left T12 vertebral body.      PMH  Atrial fibrillation  No h/o HF  No pertinent family history in first degree relatives  Multiple myeloma, remission status unspecified  HTN (hypertension)  Atrial fibrillation with RVR  Essential hypertension  NSVT (nonsustained ventricular tachycardia)  Atrial fibrillation with RVR  No significant past surgical history  HEART PALP      REVIEW OF SYSTEMS    General: Denies fever, chills, pain, no discomfort  	  Respiratory and Thorax:  Denies cough, sob, or any discomfort  	  Cardiovascular:  Denies chest pain, palpitations or any discomfort	    Gastrointestinal:  Denies n/v/d, + for chronic constipation at home, takes miralax and senna    Genitourinary:  Denies frequency, burning, or pain    Musculoskeletal:  Denies joint pain, swelling, or any discomfort     Neurological:  Denies headache, dizziness blurred vision, numbing or tingling    Hematology  Christiano bleeding or swelling    Allergic/Immunologic:	  MEDICATIONS:  losartan 100milliGRAM(s) Oral daily  hydrochlorothiazide   Tablet 25milliGRAM(s) Oral daily  diltiazem   CD 120milliGRAM(s) Oral daily  metoprolol Injectable 5milliGRAM(s) IV Push every 6 hours PRN  amiodarone    Tablet 400milliGRAM(s) Oral every 8 hours  pantoprazole    Tablet 40milliGRAM(s) Oral before breakfast  docusate sodium 100milliGRAM(s) Oral three times a day  polyethylene glycol 3350 17Gram(s) Oral daily  insulin lispro (HumaLOG) corrective regimen sliding scale  SubCutaneous three times a day before meals  dextrose Gel 1Dose(s) Oral once PRN  dextrose 50% Injectable 12.5Gram(s) IV Push once  dextrose 50% Injectable 25Gram(s) IV Push once  dextrose 50% Injectable 25Gram(s) IV Push once  glucagon  Injectable 1milliGRAM(s) IntraMuscular once PRN  enoxaparin Injectable 70milliGRAM(s) SubCutaneous every 12 hours  pyridoxine 50milliGRAM(s) Oral daily  dextrose 5%. 1000milliLiter(s) IV Continuous <Continuous>        PHYSICAL EXAM:    T(C): 37.1, Max: 37.1 ( @ 05:41)  HR: 87 (38 - 152)  BP: 120/70 (70/40 - 125/62)  RR: 16 (16 - 16)  SpO2: 100% (95% - 100%)  Wt(kg): --    I&O's Summary    I & Os for current day (as of 2017 12:02)  =============================================  IN: 1388.2 ml / OUT: 1275 ml / NET: 113.2 ml      Daily     Daily Weight in k.2 (2017 06:48)    Appearance: Normal	  HEENT:   Normal oral mucosa, PERRL, EOMI, eyes teary, conjunctive clear, scerla white,   Lymphatic: No lymphadenopathy  Cardiovascular: Normal S1 S2, No JVD, No murmurs, No edema  Respiratory: Lungs clear to auscultation	  Psychiatry: A & O x 3, Mood & affect appropriate  Gastrointestinal:  Soft, Non-tender, + BS	  Skin: No rashes, No ecchymoses, No cyanosis  Neurologic: Non-focal  Extremities: Normal range of motion, No clubbing, cyanosis or edema  Vascular: Peripheral pulses palpable 2+ bilaterally:      TELEMETRY: 	SR,                10.3   3.7   )-----------( 188      ( 2017 06:36 )             30.2     -    137  |  103  |  13.0  ----------------------------<  90  4.3   |  24.0  |  1.08    Ca    7.4<L>      2017 06:36  Phos  3.2       Mg     2.2         Problem/Plan  Dry eye - artifical tears bid    Problem/Plan  afib with RVR - Continue amio, Cardizem and dig as ordered,  Hold Lovenox, nurse aware, NPO past lunch for cath    Problems/Plan  constipation - continue Miralax as ordered, add colace, add senna

## 2017-06-22 NOTE — PROGRESS NOTE ADULT - SUBJECTIVE AND OBJECTIVE BOX
Events noted PAF stable overnight    CT PE : negative for Pulmonary Emboli  IMPRESSION:   1.  No pulmonary embolism.  2.  Diffuse lytic osseous lesions with age indeterminant mild to moderate   compression deformity of the left T12 vertebral body.    TELE:    MEDICATIONS  (STANDING):  enoxaparin Injectable 70milliGRAM(s) SubCutaneous every 12 hours  losartan 100milliGRAM(s) Oral daily  hydrochlorothiazide   Tablet 25milliGRAM(s) Oral daily  pyridoxine 50milliGRAM(s) Oral daily  pantoprazole    Tablet 40milliGRAM(s) Oral before breakfast  insulin lispro (HumaLOG) corrective regimen sliding scale  SubCutaneous three times a day before meals  dextrose 5%. 1000milliLiter(s) IV Continuous <Continuous>  dextrose 50% Injectable 12.5Gram(s) IV Push once  dextrose 50% Injectable 25Gram(s) IV Push once  dextrose 50% Injectable 25Gram(s) IV Push once  docusate sodium 100milliGRAM(s) Oral three times a day  polyethylene glycol 3350 17Gram(s) Oral daily  diltiazem   CD 120milliGRAM(s) Oral daily  potassium acid phosphate/sodium acid phosphate tablet (K-PHOS No. 2) 1Tablet(s) Oral four times a day with meals  amiodarone    Tablet 400milliGRAM(s) Oral every 8 hours    MEDICATIONS  (PRN):  dextrose Gel 1Dose(s) Oral once PRN Blood Glucose LESS THAN 70 milliGRAM(s)/deciliter  glucagon  Injectable 1milliGRAM(s) IntraMuscular once PRN Glucose LESS THAN 70 milligrams/deciliter  metoprolol Injectable 5milliGRAM(s) IV Push every 6 hours PRN Sustained HR > 130s      Allergies    No Known Allergies    Intolerances      PAST MEDICAL & SURGICAL HISTORY:  Multiple myeloma, remission status unspecified  HTN (hypertension)  No significant past surgical history      Vital Signs Last 24 Hrs  T(C): 37.1, Max: 37.1 (06-22 @ 05:41)  T(F): 98.7, Max: 98.7 (06-22 @ 05:41)  HR: 87 (38 - 152)  BP: 120/70 (70/40 - 125/62)  BP(mean): --  RR: 16 (16 - 16)  SpO2: 100% (95% - 100%)    Physical Exam:  Constitutional: NAD, AAOx3  Cardiovascular: +S1S2 RRR  Pulmonary: CTA b/l, unlabored  Abd: soft NTND +BS  Groins: C/D/I bilaterally; no bleeding, hematoma, edema  Extremities: no pedal edema, +distal pulses b/l  Neuro: non focal, MATAMOROS x4    LABS:                        10.3   3.7   )-----------( 188      ( 22 Jun 2017 06:36 )             30.2     06-22    137  |  103  |  13.0  ----------------------------<  90  4.3   |  24.0  |  1.08    Ca    7.4<L>      22 Jun 2017 06:36  Phos  3.2     06-22  Mg     2.2     06-22      PT/INR - ( 22 Jun 2017 06:36 )   PT: 18.0 sec;   INR: 1.62 ratio         PTT - ( 22 Jun 2017 06:36 )  PTT:37.2 sec      Assessment:   *** y/o female h/o ***; now POD #1 s/p ***.     Plan:     Access site care and activity limitations reviewed w/ pt.   Outpt f/up in 4-6 weeks. Events noted ; syncopal episode yesterday while sitting , AF with RVR; Digoxin started this morning in SR PVCs and 3 b NSVT. Today feels better    CT PE : negative for Pulmonary Emboli  IMPRESSION:   1.  No pulmonary embolism.  2.  Diffuse lytic osseous lesions with age indeterminant mild to moderate   compression deformity of the left T12 vertebral body.    TELE: SR , PACs     MEDICATIONS  (STANDING):  enoxaparin Injectable 70milliGRAM(s) SubCutaneous every 12 hours  losartan 100milliGRAM(s) Oral daily  hydrochlorothiazide   Tablet 25milliGRAM(s) Oral daily  pyridoxine 50milliGRAM(s) Oral daily  pantoprazole    Tablet 40milliGRAM(s) Oral before breakfast  insulin lispro (HumaLOG) corrective regimen sliding scale  SubCutaneous three times a day before meals  dextrose 5%. 1000milliLiter(s) IV Continuous <Continuous>  dextrose 50% Injectable 12.5Gram(s) IV Push once  dextrose 50% Injectable 25Gram(s) IV Push once  dextrose 50% Injectable 25Gram(s) IV Push once  docusate sodium 100milliGRAM(s) Oral three times a day  polyethylene glycol 3350 17Gram(s) Oral daily  diltiazem   CD 120milliGRAM(s) Oral daily  potassium acid phosphate/sodium acid phosphate tablet (K-PHOS No. 2) 1Tablet(s) Oral four times a day with meals  amiodarone    Tablet 400milliGRAM(s) Oral every 8 hours    MEDICATIONS  (PRN):  dextrose Gel 1Dose(s) Oral once PRN Blood Glucose LESS THAN 70 milliGRAM(s)/deciliter  glucagon  Injectable 1milliGRAM(s) IntraMuscular once PRN Glucose LESS THAN 70 milligrams/deciliter  metoprolol Injectable 5milliGRAM(s) IV Push every 6 hours PRN Sustained HR > 130s      Allergies    No Known Allergies    Intolerances      PAST MEDICAL & SURGICAL HISTORY:  Multiple myeloma, remission status unspecified  HTN (hypertension)  No significant past surgical history      Vital Signs Last 24 Hrs  T(C): 37.1, Max: 37.1 (06-22 @ 05:41)  T(F): 98.7, Max: 98.7 (06-22 @ 05:41)  HR: 87 (38 - 152)  BP: 120/70 (70/40 - 125/62)  BP(mean): --  RR: 16 (16 - 16)  SpO2: 100% (95% - 100%)    Physical Exam:  Constitutional: NAD, AAOx3  Cardiovascular: +S1S2 RRR  Pulmonary: CTA b/l, unlabored  Abd: soft NTND +BS  Groins: C/D/I bilaterally; no bleeding, hematoma, edema  Extremities: no pedal edema, +distal pulses b/l  Neuro: non focal, MATAMOROS x4    LABS:                        10.3   3.7   )-----------( 188      ( 22 Jun 2017 06:36 )             30.2     06-22    137  |  103  |  13.0  ----------------------------<  90  4.3   |  24.0  |  1.08    Ca    7.4<L>      22 Jun 2017 06:36  Phos  3.2     06-22  Mg     2.2     06-22      PT/INR - ( 22 Jun 2017 06:36 )   PT: 18.0 sec;   INR: 1.62 ratio         PTT - ( 22 Jun 2017 06:36 )  PTT:37.2 sec

## 2017-06-23 LAB
ANION GAP SERPL CALC-SCNC: 12 MMOL/L — SIGNIFICANT CHANGE UP (ref 5–17)
APTT BLD: 35.4 SEC — SIGNIFICANT CHANGE UP (ref 27.5–37.4)
BUN SERPL-MCNC: 14 MG/DL — SIGNIFICANT CHANGE UP (ref 8–20)
CALCIUM SERPL-MCNC: 7.5 MG/DL — LOW (ref 8.6–10.2)
CHLORIDE SERPL-SCNC: 101 MMOL/L — SIGNIFICANT CHANGE UP (ref 98–107)
CO2 SERPL-SCNC: 24 MMOL/L — SIGNIFICANT CHANGE UP (ref 22–29)
CREAT SERPL-MCNC: 0.96 MG/DL — SIGNIFICANT CHANGE UP (ref 0.5–1.3)
EOSINOPHIL NFR BLD AUTO: 3 % — SIGNIFICANT CHANGE UP (ref 0–6)
GLUCOSE SERPL-MCNC: 75 MG/DL — SIGNIFICANT CHANGE UP (ref 70–115)
HCT VFR BLD CALC: 27.2 % — LOW (ref 42–52)
HGB BLD-MCNC: 9.5 G/DL — LOW (ref 14–18)
INR BLD: 1.71 RATIO — HIGH (ref 0.88–1.16)
LYMPHOCYTES # BLD AUTO: 30 % — SIGNIFICANT CHANGE UP (ref 20–55)
MACROCYTES BLD QL: SLIGHT — SIGNIFICANT CHANGE UP
MAGNESIUM SERPL-MCNC: 1.9 MG/DL — SIGNIFICANT CHANGE UP (ref 1.6–2.6)
MCHC RBC-ENTMCNC: 31.8 PG — HIGH (ref 27–31)
MCHC RBC-ENTMCNC: 34.9 G/DL — SIGNIFICANT CHANGE UP (ref 32–36)
MCV RBC AUTO: 91 FL — SIGNIFICANT CHANGE UP (ref 80–94)
MICROCYTES BLD QL: SLIGHT — SIGNIFICANT CHANGE UP
MONOCYTES NFR BLD AUTO: 6 % — SIGNIFICANT CHANGE UP (ref 3–10)
NEUTROPHILS NFR BLD AUTO: 60 % — SIGNIFICANT CHANGE UP (ref 37–73)
OVALOCYTES BLD QL SMEAR: SLIGHT — SIGNIFICANT CHANGE UP
PHOSPHATE SERPL-MCNC: 2.9 MG/DL — SIGNIFICANT CHANGE UP (ref 2.4–4.7)
PLAT MORPH BLD: NORMAL — SIGNIFICANT CHANGE UP
PLATELET # BLD AUTO: 228 K/UL — SIGNIFICANT CHANGE UP (ref 150–400)
POIKILOCYTOSIS BLD QL AUTO: SLIGHT — SIGNIFICANT CHANGE UP
POTASSIUM SERPL-MCNC: 3.8 MMOL/L — SIGNIFICANT CHANGE UP (ref 3.5–5.3)
POTASSIUM SERPL-SCNC: 3.8 MMOL/L — SIGNIFICANT CHANGE UP (ref 3.5–5.3)
PROTHROM AB SERPL-ACNC: 19 SEC — HIGH (ref 9.8–12.7)
RBC # BLD: 2.99 M/UL — LOW (ref 4.6–6.2)
RBC # FLD: 17.8 % — HIGH (ref 11–15.6)
RBC BLD AUTO: ABNORMAL
SODIUM SERPL-SCNC: 137 MMOL/L — SIGNIFICANT CHANGE UP (ref 135–145)
VARIANT LYMPHS # BLD: 1 % — SIGNIFICANT CHANGE UP (ref 0–6)
WBC # BLD: 3.3 K/UL — LOW (ref 4.8–10.8)
WBC # FLD AUTO: 3.3 K/UL — LOW (ref 4.8–10.8)

## 2017-06-23 PROCEDURE — 99233 SBSQ HOSP IP/OBS HIGH 50: CPT

## 2017-06-23 PROCEDURE — 93010 ELECTROCARDIOGRAM REPORT: CPT

## 2017-06-23 RX ORDER — WARFARIN SODIUM 2.5 MG/1
7 TABLET ORAL ONCE
Qty: 0 | Refills: 0 | Status: COMPLETED | OUTPATIENT
Start: 2017-06-23 | End: 2017-06-23

## 2017-06-23 RX ADMIN — POLYETHYLENE GLYCOL 3350 17 GRAM(S): 17 POWDER, FOR SOLUTION ORAL at 12:01

## 2017-06-23 RX ADMIN — Medication 120 MILLIGRAM(S): at 05:29

## 2017-06-23 RX ADMIN — AMIODARONE HYDROCHLORIDE 400 MILLIGRAM(S): 400 TABLET ORAL at 05:28

## 2017-06-23 RX ADMIN — WARFARIN SODIUM 7 MILLIGRAM(S): 2.5 TABLET ORAL at 22:16

## 2017-06-23 RX ADMIN — SENNA PLUS 2 TABLET(S): 8.6 TABLET ORAL at 22:16

## 2017-06-23 RX ADMIN — AMIODARONE HYDROCHLORIDE 400 MILLIGRAM(S): 400 TABLET ORAL at 22:16

## 2017-06-23 RX ADMIN — Medication 100 MILLIGRAM(S): at 05:29

## 2017-06-23 RX ADMIN — PANTOPRAZOLE SODIUM 40 MILLIGRAM(S): 20 TABLET, DELAYED RELEASE ORAL at 05:29

## 2017-06-23 RX ADMIN — Medication 50 MILLIGRAM(S): at 12:01

## 2017-06-23 RX ADMIN — Medication 0.25 MILLIGRAM(S): at 05:29

## 2017-06-23 RX ADMIN — ENOXAPARIN SODIUM 70 MILLIGRAM(S): 100 INJECTION SUBCUTANEOUS at 12:00

## 2017-06-23 RX ADMIN — Medication 100 MILLIGRAM(S): at 12:01

## 2017-06-23 RX ADMIN — Medication 100 MILLIGRAM(S): at 22:17

## 2017-06-23 RX ADMIN — ENOXAPARIN SODIUM 70 MILLIGRAM(S): 100 INJECTION SUBCUTANEOUS at 22:15

## 2017-06-23 RX ADMIN — Medication 0.25 MILLIGRAM(S): at 01:03

## 2017-06-23 RX ADMIN — LOSARTAN POTASSIUM 100 MILLIGRAM(S): 100 TABLET, FILM COATED ORAL at 05:30

## 2017-06-23 RX ADMIN — AMIODARONE HYDROCHLORIDE 400 MILLIGRAM(S): 400 TABLET ORAL at 16:56

## 2017-06-23 NOTE — PROGRESS NOTE ADULT - SUBJECTIVE AND OBJECTIVE BOX
CHIEF COMPLAINT/INTERVAL HISTORY:    Patient is a 61y old  Male who presents with a chief complaint of palpitations (19 Jun 2017 09:26)      HPI:  61y Male with history of hypertension, MM (on revlimid & coumadin x 14 days) presenting with chest pain, SOB and palpitations. Patient has had difficulty sleeping at night due to palp. Presents with chest pain, right sided, no associated n/v/d. Last night had left sided CP. Denies light headedness/dizziness, fevers/chills, abdominal pain, n/v, diarrhea/constipation, dysuria or increased urinary frequency. Previous angiogram 5 years prior noted to be normal per patient with no PCI/CABG. Noted to be in New onset afib with RVR. Pt was started on Cardizem gtt & given Lovenox x 1 dose. INR 1.2, last INR check was 10 days ago, unsure of the result. Going for CV today      Hemonc: Dr. Aman Hummel (19 Jun 2017 09:26)      SUBJECTIVE & OBJECTIVE: Pt seen and examined at bedside. Currently NSR 70's on tele. No chest pain, palpitations, light headedness/dizziness, difficulty breathing/cough, fevers/chills, abdominal pain, n/v, diarrhea/ dysuria or increased urinary frequency. Only c/o mild SOB    ICU Vital Signs Last 24 Hrs  T(C): 36.9, Max: 37.6 (06-22 @ 21:50)  T(F): 98.5, Max: 99.7 (06-22 @ 21:50)  HR: 78 (76 - 86)  BP: 118/68 (102/61 - 127/70)  BP(mean): --  ABP: --  ABP(mean): --  RR: 17 (15 - 18)  SpO2: 96% (96% - 100%)        MEDICATIONS  (STANDING):  enoxaparin Injectable 70milliGRAM(s) SubCutaneous every 12 hours  losartan 100milliGRAM(s) Oral daily  hydrochlorothiazide   Tablet 25milliGRAM(s) Oral daily  pyridoxine 50milliGRAM(s) Oral daily  pantoprazole    Tablet 40milliGRAM(s) Oral before breakfast  docusate sodium 100milliGRAM(s) Oral three times a day  polyethylene glycol 3350 17Gram(s) Oral daily  diltiazem   CD 120milliGRAM(s) Oral daily  amiodarone    Tablet 400milliGRAM(s) Oral every 8 hours  digoxin     Tablet 0.25milliGRAM(s) Oral daily  artificial  tears Solution 1Drop(s) Both EYES two times a day  senna 2Tablet(s) Oral at bedtime  sodium chloride 0.9%. 1000milliLiter(s) IV Continuous <Continuous>  insulin lispro (HumaLOG) corrective regimen sliding scale  SubCutaneous before breakfast  dextrose 5%. 1000milliLiter(s) IV Continuous <Continuous>  dextrose 50% Injectable 12.5Gram(s) IV Push once  dextrose 50% Injectable 25Gram(s) IV Push once  dextrose 50% Injectable 25Gram(s) IV Push once  warfarin 7milliGRAM(s) Oral once    MEDICATIONS  (PRN):  metoprolol Injectable 5milliGRAM(s) IV Push every 6 hours PRN Sustained HR > 130s  dextrose Gel 1Dose(s) Oral once PRN Blood Glucose LESS THAN 70 milliGRAM(s)/deciliter  glucagon  Injectable 1milliGRAM(s) IntraMuscular once PRN Glucose LESS THAN 70 milligrams/deciliter  bisacodyl Suppository 10milliGRAM(s) Rectal daily PRN Constipation      LABS:                        9.5    3.3   )-----------( 228      ( 23 Jun 2017 06:52 )             27.2     06-23    137  |  101  |  14.0  ----------------------------<  75  3.8   |  24.0  |  0.96    Ca    7.5<L>      23 Jun 2017 06:52  Phos  2.9     06-23  Mg     1.9     06-23      PT/INR - ( 23 Jun 2017 06:52 )   PT: 19.0 sec;   INR: 1.71 ratio         PTT - ( 23 Jun 2017 06:52 )  PTT:35.4 sec      CAPILLARY BLOOD GLUCOSE  105 (23 Jun 2017 11:42)  80 (23 Jun 2017 09:11)  92 (22 Jun 2017 16:40)      RECENT CULTURES:      RADIOLOGY & ADDITIONAL TESTS:      PHYSICAL EXAM:        GENERAL: NAD, well-groomed, well-developed  HEAD:  Atraumatic, Normocephalic  EYES: EOMI, PERRLA, conjunctiva and sclera clear  ENMT: Moist mucous membranes  NECK: Supple, No JVD  NERVOUS SYSTEM:  Alert & Oriented X3, Motor Strength 5/5 B/L upper and lower extremities; DTRs 2+ intact and symmetric  CHEST/LUNG: Clear to auscultation bilaterally; No rales, rhonchi, wheezing, or rubs  HEART: IRR; No murmurs, rubs, or gallops  ABDOMEN: Soft, Nontender, Nondistended; Bowel sounds present  EXTREMITIES:  2+ Peripheral Pulses, No clubbing, cyanosis, or edema

## 2017-06-23 NOTE — PROGRESS NOTE ADULT - ASSESSMENT
61y Male with history of hypertension, MM (on revlimid & coumadin x 14 days) presenting with chest pain, SOB and palpitations. Patient has had difficulty sleeping at night due to palp. Presents with chest pain, right sided, no associated n/v/d. Last night had left sided CP. Denies light headedness/dizziness, fevers/chills, abdominal pain, n/v, diarrhea/constipation, dysuria or increased urinary frequency. Previous angiogram 5 years prior noted to be normal per patient with no PCI/CABG. Noted to be in New onset afib with RVR. Pt didnt revert after 3 doses of  cardizem IVP, was started on Cardizem gtt & given Lovenox x 1 dose. INR 1.2, last INR check was 10 days ago, unsure of the result.     New onset afib with RVR- ?revlimid side effect-s/p DCCV which failed, on cardizem PO & amio PO, s/p Amio gtt, nreverted back to Afib with RVR but became hypotensive on Cardizem gtt,  now on Dig PO, Afib hard to control, need to r/o ischemia, going for cath today (recent Stress test -ve), CTA -ve for PEEcho with EF 65%, no DD noted, c/w AC with bridging, TSH wnl, cardio recs appreciated,  MCOT prior to d/c as per EPS    MM- revlimid  to be started after 14 days.   HTN- c/w losartan, HCTZ  DM- hold metformin/ glimeperide, ISS, f/u FS  GERD- c/w PPI  DVT ppx on AC
61y Male with history of hypertension, MM  and AF with RVR s/p DAREN/DCCV w with Early Recurrence of AF, ECG with ischemic changes V3-V6 and low (+) TNI , syncopal episode yesterday while sitting telemetry AF with RVR at the time currently stable in NSR    Problem/Plan1: (+) TNI/Abnomral ECG : CT PE neg for PE: agree with cardiac cath r/o occult ischemia hold Lovenox  for planned cath this PM.  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 Woismal recommend MCOT prior to d/c  so that we can assess AF burden at home prior to planned travel to [place]  Problem/Plan3: HTN: BP stable  Problem/Plan4: MM: On imuno mod rxn
61y Male with history of hypertension, MM (on revlimid & coumadin x 14 days) presenting with chest pain, SOB and palpitations. Patient has had difficulty sleeping at night due to palp. Presents with chest pain, right sided, no associated n/v/d. Last night had left sided CP. Denies light headedness/dizziness, fevers/chills, abdominal pain, n/v, diarrhea/constipation, dysuria or increased urinary frequency. Previous angiogram 5 years prior noted to be normal per patient with no PCI/CABG. Noted to be in New onset afib with RVR. Pt didnt revert after 3 doses of  cardizem IVP, was started on Cardizem gtt & given Lovenox x 1 dose. INR 1.2, last INR check was 10 days ago, unsure of the result.     New onset afib with RVR- ?revlimid side effect-s/p DCCV which failed, on cardizem PO & amio PO, s/p Amio gtt, nreverted back to Afib with RVR but became hypotensive on Cardizem gtt,  now on Dig PO, Afib hard to control, S/P LHC without PCI no CAD, CTA -ve for PEEcho with EF 65%, no DD noted, c/w AC with bridging, TSH wnl, cardio recs appreciated,  Plans to return to [place] early next week. Cardionet monitoring system & instructions (written & verbal) provided to pt. pt was counseled to establish care w/ cardiologist in [place] [facility].   Continue amiodarone - pt will need LFTs & TFTs checked q 6 months while on amio and PFTs & fundoscopic exam annually.     MM- revlimid  to be started when due with caution  HTN- c/w losartan, HCTZ  DM- hold metformin/ glimeperide, ISS, f/u FS  GERD- c/w PPI  DVT ppx on AC
61y Male with history of hypertension, MM (on revlimid & coumadin x 14 days) presenting with chest pain, SOB and palpitations. Patient has had difficulty sleeping at night due to palp. Presents with chest pain, right sided, no associated n/v/d. Last night had left sided CP. Denies light headedness/dizziness, fevers/chills, abdominal pain, n/v, diarrhea/constipation, dysuria or increased urinary frequency. Previous angiogram 5 years prior noted to be normal per patient with no PCI/CABG. Noted to be in New onset afib with RVR. Pt didnt revert after 3 doses of  cardizem IVP, was started on Cardizem gtt & given Lovenox x 1 dose. INR 1.2, last INR check was 10 days ago, unsure of the result.     New onset afib with RVR- ?revlimid side effect-s/p DCCV, on cardizem PO & amio PO, now back in Afib with RVR, Cardizem gtt restarted as per cardio,  CP/ mild congestion on CXR/ BNP elevated likely from Afib, Echo with EF 65%, no DD noted, c/w AC with bridging, TSH wnl, cardio recs appreciated    MM- revlimid  to be started after 14 days.   HTN- c/w losartan, HCTZ  DM- hold metformin/ glimeperide, ISS, f/u FS  GERD- c/w PPI  DVT ppx on AC
61y Male with history of hypertension, MM (on revlimid & coumadin x 14 days) presenting with chest pain, SOB and palpitations. Patient has had difficulty sleeping at night due to palp. Presents with chest pain, right sided, no associated n/v/d. Last night had left sided CP. Denies light headedness/dizziness, fevers/chills, abdominal pain, n/v, diarrhea/constipation, dysuria or increased urinary frequency. Previous angiogram 5 years prior noted to be normal per patient with no PCI/CABG. Noted to be in New onset afib with RVR. Pt didnt revert after 3 doses of  cardizem IVP, was started on Cardizem gtt & given Lovenox x 1 dose. INR 1.2, last INR check was 10 days ago, unsure of the result.     New onset afib with RVR- ?revlimid side effect-s/p DCCV, on cardizem PO, now back in Afib with RVR, Cardizem 10 IVP given x 1, Amio load as per Cardio, CP/ mild congestion on CXR/ BNP elevated likely from Afib, Echo with EF 65%, no DD noted, c/w AC with bridging, TSH wnl, cardio recs appreciated    MM- revlimid  to be started after 14 days.   HTN- c/w losartan, HCTZ  DM- hold metformin/ glimeperide, ISS, f/u FS  GERD- c/w PPI  DVT ppx on AC

## 2017-06-23 NOTE — PROGRESS NOTE ADULT - ATTENDING COMMENTS
Patient s/p coronary angiogram and noted to be normal. For afib, patient is on amiodarone, digoxin and cardizem. Continue current meds. Start lovenox and coumadin tonight and monitor. If stable, dc planning for saturday.
Cannot d/c pt home today as has no PMD for f/u regarding INR check in [place]. Would rather f/u with PMD in [place]. For convenience pt was advised to stay back until tomorrow, get INR therapeutic, d/c lovenox prior to d/c home tomorrow. Discussed with pt's niece [phone]

## 2017-06-23 NOTE — PROGRESS NOTE ADULT - SUBJECTIVE AND OBJECTIVE BOX
Post cath site check note    CC: Post cath site check    HPI: Patient S/P LHC without PCI. OOB to chair without any complaints this morning.     PAST MEDICAL & SURGICAL HISTORY:  Multiple myeloma, remission status unspecified  HTN (hypertension)  No significant past surgical history      REVIEW OF SYSTEMS:    CONSTITUTIONAL: No fever, weight loss, or fatigue  NECK: No pain or stiffness  RESPIRATORY: No cough, wheezing, chills or hemoptysis; No shortness of breath  CARDIOVASCULAR: No chest pain, palpitations, dizziness, or leg swelling  GASTROINTESTINAL: No abdominal or epigastric pain. No nausea, vomiting, or hematemesis; No diarrhea or constipation. No melena or hematochezia.  NEUROLOGICAL: No headaches, memory loss, loss of strength, numbness, or tremors  SKIN: No itching, burning, rashes, or lesions   HEME/LYMPH: No easy bruising, or bleeding gums  ALLERY AND IMMUNOLOGIC: No hives or eczema      Allergies    No Known Allergies    Intolerances        MEDICATIONS  (STANDING):  enoxaparin Injectable 70milliGRAM(s) SubCutaneous every 12 hours  losartan 100milliGRAM(s) Oral daily  hydrochlorothiazide   Tablet 25milliGRAM(s) Oral daily  pyridoxine 50milliGRAM(s) Oral daily  pantoprazole    Tablet 40milliGRAM(s) Oral before breakfast  docusate sodium 100milliGRAM(s) Oral three times a day  polyethylene glycol 3350 17Gram(s) Oral daily  diltiazem   CD 120milliGRAM(s) Oral daily  amiodarone    Tablet 400milliGRAM(s) Oral every 8 hours  digoxin     Tablet 0.25milliGRAM(s) Oral daily  artificial  tears Solution 1Drop(s) Both EYES two times a day  senna 2Tablet(s) Oral at bedtime  sodium chloride 0.9%. 1000milliLiter(s) IV Continuous <Continuous>  insulin lispro (HumaLOG) corrective regimen sliding scale  SubCutaneous before breakfast  dextrose 5%. 1000milliLiter(s) IV Continuous <Continuous>  dextrose 50% Injectable 12.5Gram(s) IV Push once  dextrose 50% Injectable 25Gram(s) IV Push once  dextrose 50% Injectable 25Gram(s) IV Push once    MEDICATIONS  (PRN):  metoprolol Injectable 5milliGRAM(s) IV Push every 6 hours PRN Sustained HR > 130s  dextrose Gel 1Dose(s) Oral once PRN Blood Glucose LESS THAN 70 milliGRAM(s)/deciliter  glucagon  Injectable 1milliGRAM(s) IntraMuscular once PRN Glucose LESS THAN 70 milligrams/deciliter      Vital Signs Last 24 Hrs  T(C): 36.8, Max: 37.6 (06-22 @ 21:50)  T(F): 98.3, Max: 99.7 (06-22 @ 21:50)  HR: 78 (72 - 86)  BP: 112/58 (102/61 - 127/70)  BP(mean): --  RR: 16 (15 - 18)  SpO2: 98% (98% - 100%)    PHYSICAL EXAM:    GENERAL: NAD, well-groomed, well-developed  HEAD:  Atraumatic, Normocephalic  EYES: EOMI, PERRLA, conjunctiva and sclera clear  NECK: Supple, No JVD, Normal thyroid  NERVOUS SYSTEM:  Alert & Oriented X3, Good concentration; Motor Strength 5/5 B/L upper and lower extremities; DTRs 2+ intact and symmetric  CHEST/LUNG: Clear to percussion bilaterally; No rales, rhonchi, wheezing, or rubs  HEART: Regular rate and rhythm; No murmurs, rubs, or gallops  ABDOMEN: Soft, Nontender, Nondistended; Bowel sounds present  EXTREMITIES:  2+ Peripheral Pulses, No clubbing, cyanosis, or edema  Right groin no hematoma, no bleeding, non tender, good hemostasis   SKIN: No rashes or lesions          Assessment: S/P LHC without PCI no CAD    Plan: reviewed site care instructions with the patient  Further care per PCP and team

## 2017-06-23 NOTE — CHART NOTE - NSCHARTNOTEFT_GEN_A_CORE
Pt remains in SR. Plans to return to [place] early next week. This was d/w Dr. Zhang - will continue with plan for 2 week monitor. However, pt will establish care w/ cardiologist in [place] [facility].   Cardionet monitoring system & instructions (written & verbal) provided to pt.  Continue amiodarone - pt will need LFTs & TFTs checked q 6 months while on amio and PFTs & fundoscopic exam annually.   Continue a/c coverage w/ Lovenox until INR therapeutic.   With ongoing maintenance of sinus rhythm, no barriers to d/c home from EP perspective.  Above d/w Dr. Zhang & Dr. Jose

## 2017-06-24 VITALS
RESPIRATION RATE: 16 BRPM | OXYGEN SATURATION: 97 % | HEART RATE: 75 BPM | TEMPERATURE: 99 F | SYSTOLIC BLOOD PRESSURE: 132 MMHG | DIASTOLIC BLOOD PRESSURE: 74 MMHG

## 2017-06-24 LAB
ANION GAP SERPL CALC-SCNC: 12 MMOL/L — SIGNIFICANT CHANGE UP (ref 5–17)
ANISOCYTOSIS BLD QL: SLIGHT — SIGNIFICANT CHANGE UP
APTT BLD: 38.8 SEC — HIGH (ref 27.5–37.4)
BUN SERPL-MCNC: 13 MG/DL — SIGNIFICANT CHANGE UP (ref 8–20)
CALCIUM SERPL-MCNC: 7.8 MG/DL — LOW (ref 8.6–10.2)
CHLORIDE SERPL-SCNC: 102 MMOL/L — SIGNIFICANT CHANGE UP (ref 98–107)
CO2 SERPL-SCNC: 25 MMOL/L — SIGNIFICANT CHANGE UP (ref 22–29)
CREAT SERPL-MCNC: 0.93 MG/DL — SIGNIFICANT CHANGE UP (ref 0.5–1.3)
GLUCOSE SERPL-MCNC: 86 MG/DL — SIGNIFICANT CHANGE UP (ref 70–115)
HCT VFR BLD CALC: 28.6 % — LOW (ref 42–52)
HGB BLD-MCNC: 9.9 G/DL — LOW (ref 14–18)
INR BLD: 2.18 RATIO — HIGH (ref 0.88–1.16)
LYMPHOCYTES # BLD AUTO: 39 % — SIGNIFICANT CHANGE UP (ref 20–55)
MACROCYTES BLD QL: SLIGHT — SIGNIFICANT CHANGE UP
MAGNESIUM SERPL-MCNC: 2 MG/DL — SIGNIFICANT CHANGE UP (ref 1.6–2.6)
MCHC RBC-ENTMCNC: 31.9 PG — HIGH (ref 27–31)
MCHC RBC-ENTMCNC: 34.6 G/DL — SIGNIFICANT CHANGE UP (ref 32–36)
MCV RBC AUTO: 92.3 FL — SIGNIFICANT CHANGE UP (ref 80–94)
MONOCYTES NFR BLD AUTO: 16 % — HIGH (ref 3–10)
NEUTROPHILS NFR BLD AUTO: 45 % — SIGNIFICANT CHANGE UP (ref 37–73)
PHOSPHATE SERPL-MCNC: 2.9 MG/DL — SIGNIFICANT CHANGE UP (ref 2.4–4.7)
PLAT MORPH BLD: NORMAL — SIGNIFICANT CHANGE UP
PLATELET # BLD AUTO: 275 K/UL — SIGNIFICANT CHANGE UP (ref 150–400)
POIKILOCYTOSIS BLD QL AUTO: SLIGHT — SIGNIFICANT CHANGE UP
POTASSIUM SERPL-MCNC: 3.9 MMOL/L — SIGNIFICANT CHANGE UP (ref 3.5–5.3)
POTASSIUM SERPL-SCNC: 3.9 MMOL/L — SIGNIFICANT CHANGE UP (ref 3.5–5.3)
PROTHROM AB SERPL-ACNC: 24.4 SEC — HIGH (ref 9.8–12.7)
RBC # BLD: 3.1 M/UL — LOW (ref 4.6–6.2)
RBC # FLD: 17.7 % — HIGH (ref 11–15.6)
RBC BLD AUTO: ABNORMAL
SODIUM SERPL-SCNC: 139 MMOL/L — SIGNIFICANT CHANGE UP (ref 135–145)
WBC # BLD: 2.4 K/UL — LOW (ref 4.8–10.8)
WBC # FLD AUTO: 2.4 K/UL — LOW (ref 4.8–10.8)

## 2017-06-24 PROCEDURE — 93454 CORONARY ARTERY ANGIO S&I: CPT

## 2017-06-24 PROCEDURE — C1894: CPT

## 2017-06-24 PROCEDURE — 71275 CT ANGIOGRAPHY CHEST: CPT

## 2017-06-24 PROCEDURE — 93458 L HRT ARTERY/VENTRICLE ANGIO: CPT

## 2017-06-24 PROCEDURE — 85379 FIBRIN DEGRADATION QUANT: CPT

## 2017-06-24 PROCEDURE — 84443 ASSAY THYROID STIM HORMONE: CPT

## 2017-06-24 PROCEDURE — 85610 PROTHROMBIN TIME: CPT

## 2017-06-24 PROCEDURE — 96372 THER/PROPH/DIAG INJ SC/IM: CPT | Mod: XU

## 2017-06-24 PROCEDURE — 36415 COLL VENOUS BLD VENIPUNCTURE: CPT

## 2017-06-24 PROCEDURE — C1887: CPT

## 2017-06-24 PROCEDURE — 93325 DOPPLER ECHO COLOR FLOW MAPG: CPT

## 2017-06-24 PROCEDURE — 83036 HEMOGLOBIN GLYCOSYLATED A1C: CPT

## 2017-06-24 PROCEDURE — 84484 ASSAY OF TROPONIN QUANT: CPT

## 2017-06-24 PROCEDURE — 84436 ASSAY OF TOTAL THYROXINE: CPT

## 2017-06-24 PROCEDURE — 93312 ECHO TRANSESOPHAGEAL: CPT

## 2017-06-24 PROCEDURE — 99291 CRITICAL CARE FIRST HOUR: CPT | Mod: 25

## 2017-06-24 PROCEDURE — 93005 ELECTROCARDIOGRAM TRACING: CPT

## 2017-06-24 PROCEDURE — 83735 ASSAY OF MAGNESIUM: CPT

## 2017-06-24 PROCEDURE — 85730 THROMBOPLASTIN TIME PARTIAL: CPT

## 2017-06-24 PROCEDURE — 99239 HOSP IP/OBS DSCHRG MGMT >30: CPT

## 2017-06-24 PROCEDURE — 93320 DOPPLER ECHO COMPLETE: CPT

## 2017-06-24 PROCEDURE — 96374 THER/PROPH/DIAG INJ IV PUSH: CPT

## 2017-06-24 PROCEDURE — 80048 BASIC METABOLIC PNL TOTAL CA: CPT

## 2017-06-24 PROCEDURE — 92960 CARDIOVERSION ELECTRIC EXT: CPT

## 2017-06-24 PROCEDURE — 80053 COMPREHEN METABOLIC PANEL: CPT

## 2017-06-24 PROCEDURE — 82550 ASSAY OF CK (CPK): CPT

## 2017-06-24 PROCEDURE — 83880 ASSAY OF NATRIURETIC PEPTIDE: CPT

## 2017-06-24 PROCEDURE — 71045 X-RAY EXAM CHEST 1 VIEW: CPT

## 2017-06-24 PROCEDURE — C1769: CPT

## 2017-06-24 PROCEDURE — 84100 ASSAY OF PHOSPHORUS: CPT

## 2017-06-24 PROCEDURE — 85027 COMPLETE CBC AUTOMATED: CPT

## 2017-06-24 PROCEDURE — 96376 TX/PRO/DX INJ SAME DRUG ADON: CPT

## 2017-06-24 RX ORDER — AMIODARONE HYDROCHLORIDE 400 MG/1
2 TABLET ORAL
Qty: 180 | Refills: 0 | OUTPATIENT
Start: 2017-06-24 | End: 2017-07-24

## 2017-06-24 RX ORDER — WARFARIN SODIUM 2.5 MG/1
1 TABLET ORAL
Qty: 7 | Refills: 0 | OUTPATIENT
Start: 2017-06-24 | End: 2017-07-01

## 2017-06-24 RX ORDER — PYRIDOXINE HCL (VITAMIN B6) 100 MG
1 TABLET ORAL
Qty: 0 | Refills: 0 | COMMUNITY
Start: 2017-06-24

## 2017-06-24 RX ORDER — DIGOXIN 250 MCG
1 TABLET ORAL
Qty: 30 | Refills: 0 | OUTPATIENT
Start: 2017-06-24 | End: 2017-07-24

## 2017-06-24 RX ORDER — DEXAMETHASONE 0.5 MG/5ML
0 ELIXIR ORAL
Qty: 0 | Refills: 0 | COMMUNITY

## 2017-06-24 RX ORDER — PANTOPRAZOLE SODIUM 20 MG/1
1 TABLET, DELAYED RELEASE ORAL
Qty: 30 | Refills: 0 | OUTPATIENT
Start: 2017-06-24 | End: 2017-07-24

## 2017-06-24 RX ORDER — WARFARIN SODIUM 2.5 MG/1
0 TABLET ORAL
Qty: 0 | Refills: 0 | COMMUNITY

## 2017-06-24 RX ORDER — POLYETHYLENE GLYCOL 3350 17 G/17G
17 POWDER, FOR SOLUTION ORAL DAILY
Qty: 0 | Refills: 0 | Status: DISCONTINUED | OUTPATIENT
Start: 2017-06-24 | End: 2017-06-24

## 2017-06-24 RX ORDER — DILTIAZEM HCL 120 MG
1 CAPSULE, EXT RELEASE 24 HR ORAL
Qty: 30 | Refills: 0 | OUTPATIENT
Start: 2017-06-24 | End: 2017-07-24

## 2017-06-24 RX ORDER — WARFARIN SODIUM 2.5 MG/1
4 TABLET ORAL ONCE
Qty: 0 | Refills: 0 | Status: COMPLETED | OUTPATIENT
Start: 2017-06-24 | End: 2017-06-24

## 2017-06-24 RX ADMIN — AMIODARONE HYDROCHLORIDE 400 MILLIGRAM(S): 400 TABLET ORAL at 21:41

## 2017-06-24 RX ADMIN — Medication 100 MILLIGRAM(S): at 06:50

## 2017-06-24 RX ADMIN — Medication 120 MILLIGRAM(S): at 06:50

## 2017-06-24 RX ADMIN — LOSARTAN POTASSIUM 100 MILLIGRAM(S): 100 TABLET, FILM COATED ORAL at 06:50

## 2017-06-24 RX ADMIN — AMIODARONE HYDROCHLORIDE 400 MILLIGRAM(S): 400 TABLET ORAL at 13:29

## 2017-06-24 RX ADMIN — PANTOPRAZOLE SODIUM 40 MILLIGRAM(S): 20 TABLET, DELAYED RELEASE ORAL at 06:51

## 2017-06-24 RX ADMIN — AMIODARONE HYDROCHLORIDE 400 MILLIGRAM(S): 400 TABLET ORAL at 06:50

## 2017-06-24 RX ADMIN — ENOXAPARIN SODIUM 70 MILLIGRAM(S): 100 INJECTION SUBCUTANEOUS at 13:29

## 2017-06-24 RX ADMIN — ENOXAPARIN SODIUM 70 MILLIGRAM(S): 100 INJECTION SUBCUTANEOUS at 21:41

## 2017-06-24 RX ADMIN — Medication 50 MILLIGRAM(S): at 13:29

## 2017-06-24 RX ADMIN — Medication 100 MILLIGRAM(S): at 13:29

## 2017-06-24 RX ADMIN — POLYETHYLENE GLYCOL 3350 17 GRAM(S): 17 POWDER, FOR SOLUTION ORAL at 06:49

## 2017-06-24 RX ADMIN — Medication 0.25 MILLIGRAM(S): at 06:50

## 2017-06-24 RX ADMIN — WARFARIN SODIUM 4 MILLIGRAM(S): 2.5 TABLET ORAL at 18:30

## 2017-06-24 NOTE — DISCHARGE NOTE ADULT - CARE PLAN
Principal Discharge DX:	Atrial fibrillation with RVR  Goal:	resolved  Instructions for follow-up, activity and diet:	Continue with meds as directed. INR today 2.18. Follow up at [facility] for INR check on Monday 6/26/17. Adjust coumadin as per INR. Follow up with your cardiologist within 1 week of discharge for Cardionet monitoring system & instructions (written & verbal) provided to pt. pt was counseled to establish care w/ cardiologist in [facility].   Continue amiodarone - pt will need LFTs & TFTs checked q 6 months while on amio and PFTs & fundoscopic exam annually.  Secondary Diagnosis:	Essential hypertension  Instructions for follow-up, activity and diet:	Continue with home meds as directed. Follow up with PMD  Secondary Diagnosis:	Multiple myeloma, remission status unspecified  Instructions for follow-up, activity and diet:	Continue with home meds as directed. Follow up with PMD & oncology Principal Discharge DX:	Atrial fibrillation with RVR  Goal:	resolved  Instructions for follow-up, activity and diet:	Continue with meds as directed. INR today 2.18. Follow up at [facility] for INR check on Monday 6/26/17. Adjust coumadin as per INR. Follow up with your cardiologist within 1 week of discharge for Cardionet monitoring system & instructions (written & verbal) provided to pt. pt was counseled to establish care w/ cardiologist in [facility].   Continue amiodarone - pt will need LFTs & TFTs checked q 6 months while on amio and PFTs & fundoscopic exam annually.  Secondary Diagnosis:	Essential hypertension  Instructions for follow-up, activity and diet:	Continue with home meds as directed. Follow up with PMD  Secondary Diagnosis:	Multiple myeloma, remission status unspecified  Instructions for follow-up, activity and diet:	Continue with home meds as directed. Follow up with PMD & oncology Principal Discharge DX:	Atrial fibrillation with RVR  Goal:	resolved  Instructions for follow-up, activity and diet:	Continue with meds as directed. INR today 2.18. Follow up at [facility] for INR check on Monday 6/26/17. Adjust coumadin as per INR. Follow up with your cardiologist within 1 week of discharge for Cardionet monitoring system & instructions (written & verbal) provided to pt. pt was counseled to establish care w/ cardiologist in [facility].   Continue amiodarone - pt will need LFTs & TFTs checked q 6 months while on amio and PFTs & fundoscopic exam annually.  Secondary Diagnosis:	Essential hypertension  Instructions for follow-up, activity and diet:	Continue with home meds as directed. Follow up with PMD  Secondary Diagnosis:	Multiple myeloma, remission status unspecified  Instructions for follow-up, activity and diet:	Continue with home meds as directed. Follow up with PMD & oncology

## 2017-06-24 NOTE — DISCHARGE NOTE ADULT - HOSPITAL COURSE
61y Male with history of hypertension, MM (on revlimid & coumadin x 14 days) presenting with chest pain, SOB and palpitations. Patient has had difficulty sleeping at night due to palp. Presents with chest pain, right sided, no associated n/v/d. Last night had left sided CP. Denies light headedness/dizziness, fevers/chills, abdominal pain, n/v, diarrhea/constipation, dysuria or increased urinary frequency. Previous angiogram 5 years prior noted to be normal per patient with no PCI/CABG. Noted to be in New onset afib with RVR. Pt didnt revert after 3 doses of  cardizem IVP, was started on Cardizem gtt & given Lovenox x 1 dose. INR 1.2, last INR check was 10 days ago, unsure of the result. Pt was found to have New onset afib with RVR- ?revlimid side effect-s/p DCCV which failed, on cardizem PO & amio PO, s/p Amio gtt, reverted back to Afib with RVR but became hypotensive on Cardizem gtt,  now also on Dig PO, Afib hard to control, S/P LHC to r/o ischemia, without PCI, no CAD;  CTA -ve for PE. Echo with EF 65%, no DD noted, c/w AC with bridging, INR today 2.18. TSH wnl, cardio recs appreciated,  Plans to return to [place] early next week. Cardionet monitoring system & instructions (written & verbal) provided to pt. pt was counseled to establish care w/ cardiologist in [place] [facility].   Continue amiodarone - pt will need LFTs & TFTs checked q 6 months while on amio and PFTs & fundoscopic exam annually. MM- revlimid  to be started when due with caution  HTN- c/w losartan, HCTZ. DM- c/w metformin/ glimeperide, GERD- c/w PPI. VSS. Pt medically stable for d/c. Cleared by cardio.

## 2017-06-24 NOTE — DISCHARGE NOTE ADULT - PLAN OF CARE
resolved Continue with meds as directed. INR today 2.18. Follow up at [facility] for INR check on Monday 6/26/17. Adjust coumadin as per INR. Follow up with your cardiologist within 1 week of discharge for Cardionet monitoring system & instructions (written & verbal) provided to pt. pt was counseled to establish care w/ cardiologist in [facility].   Continue amiodarone - pt will need LFTs & TFTs checked q 6 months while on amio and PFTs & fundoscopic exam annually. Continue with home meds as directed. Follow up with PMD Continue with home meds as directed. Follow up with PMD & oncology

## 2017-06-24 NOTE — DISCHARGE NOTE ADULT - PATIENT PORTAL LINK FT
“You can access the [facility] Patient Portal, offered by [facility], by registering with the following website: [email or web address]”

## 2017-06-24 NOTE — PROGRESS NOTE ADULT - SUBJECTIVE AND OBJECTIVE BOX
[place] Complaint:      HPI:  61y Male with history of hypertension, MM (on revlimid & coumadin x 14 days) presenting with chest pain, SOB and palpitations. Patient has had difficulty sleeping at night due to palp. Presents with chest pain, right sided, no associated n/v/d. Last night had left sided CP. Denies light headedness/dizziness, fevers/chills, abdominal pain, n/v, diarrhea/constipation, dysuria or increased urinary frequency. Previous angiogram 5 years prior noted to be normal per patient with no PCI/CABG. Noted to be in New onset afib with RVR. Pt was started on Cardizem gtt & given Lovenox x 1 dose. INR 1.2, last INR check was 10 days ago, unsure of the result. Going for CV today      Hemonc: Dr. Aman Hummel (2017 09:26)      Atrial fibrillation  No h/o HF  No pertinent family history in first degree relatives  Handoff  MEWS Score  Multiple myeloma, remission status unspecified  HTN (hypertension)  Atrial fibrillation with RVR  Essential hypertension  NSVT (nonsustained ventricular tachycardia)  Atrial fibrillation with RVR  No significant past surgical history  HEART PALP      REVIEW OF SYSTEMS    General:	Denies fever, chills, pain, no discomfort  Skin  	  Respiratory and Thorax:  Denies cough, sob, or any discomfort  	  Cardiovascular:  Denies chest pain, palpiations, or any discomfort	    Gastrointestinal:  Denies n/v/d, constipation, or any discomfort    Genitourinary:  Denies frequency, burning, or pain    Musculoskeletal:  Denies joint pain, swelling, or any discomfort     Neurological:  Denies headache, dizzyness, blurred vision, numbing or tingling    Psychiatric:  Denies sadness or depression    Hematology  Christiano bleeding o swelling    Allergic/Immunologic:	  MEDICATIONS:  losartan 100milliGRAM(s) Oral daily  hydrochlorothiazide   Tablet 25milliGRAM(s) Oral daily  diltiazem   CD 120milliGRAM(s) Oral daily  metoprolol Injectable 5milliGRAM(s) IV Push every 6 hours PRN  amiodarone    Tablet 400milliGRAM(s) Oral every 8 hours  digoxin     Tablet 0.25milliGRAM(s) Oral daily          pantoprazole    Tablet 40milliGRAM(s) Oral before breakfast  docusate sodium 100milliGRAM(s) Oral three times a day  polyethylene glycol 3350 17Gram(s) Oral daily  senna 2Tablet(s) Oral at bedtime    insulin lispro (HumaLOG) corrective regimen sliding scale  SubCutaneous before breakfast  dextrose Gel 1Dose(s) Oral once PRN  dextrose 50% Injectable 12.5Gram(s) IV Push once  dextrose 50% Injectable 25Gram(s) IV Push once  dextrose 50% Injectable 25Gram(s) IV Push once  glucagon  Injectable 1milliGRAM(s) IntraMuscular once PRN    enoxaparin Injectable 70milliGRAM(s) SubCutaneous every 12 hours  pyridoxine 50milliGRAM(s) Oral daily  artificial  tears Solution 1Drop(s) Both EYES two times a day  sodium chloride 0.9%. 1000milliLiter(s) IV Continuous <Continuous>  dextrose 5%. 1000milliLiter(s) IV Continuous <Continuous>        PHYSICAL EXAM:    T(C): 37.1, Max: 37.2 (- @ 22:11)  HR: 69 (66 - 72)  BP: 128/78 (118/68 - 128/78)  RR: 17 (16 - 17)  SpO2: 98% (96% - 98%)  Wt(kg): --    I&O's Summary    I & Os for current day (as of 2017 16:03)  =============================================  IN: 200 ml / OUT: 900 ml / NET: -700 ml      Daily     Daily Weight in k.1 (2017 06:44)    Appearance: Normal	  HEENT:   Normal oral mucosa, PERRL, EOMI	  Lymphatic: No lymphadenopathy  Cardiovascular: Normal S1 S2, No JVD, No murmurs, No edema  Respiratory: Lungs clear to auscultation	  Psychiatry: A & O x 3, Mood & affect appropriate  Gastrointestinal:  Soft, Non-tender, + BS	  Skin: No rashes, No ecchymoses, No cyanosis  Neurologic: Non-focal  Extremities: Normal range of motion, No clubbing, cyanosis or edema  Vascular: Peripheral pulses palpable 2+ bilaterally      TELEMETRY: 	Afib, 3 beat run v tacc, going home on several cardiac medications amio and dig. Also being discharged on CardioNet, education provided by  service.                 9.9    2.4   )-----------( 275      ( 2017 07:36 )             28.6     -    139  |  102  |  13.0  ----------------------------<  86  3.9   |  25.0  |  0.93    Ca    7.8<L>      2017 07:36  Phos  2.9       Mg     2.0      HPI:  61y Male with history of hypertension, MM (on revlimid & coumadin x 14 days) presenting with chest pain, SOB and palpitations. Patient has had difficulty sleeping at night due to palp. Presents with chest pain, right sided, no associated n/v/d. Last night had left sided CP. Denies light headedness/dizziness, fevers/chills, abdominal pain, n/v, diarrhea/constipation, dysuria or increased urinary frequency. Previous angiogram 5 years prior noted to be normal per patient with no PCI/CABG. Noted to be in New onset afib with RVR. Pt was started on Cardizem gtt & given Lovenox x 1 dose. INR 1.2, last INR check was 10 days ago, unsure of the result. Going for CV today.   Rapid afib continued underwent cardioversion converted to SR but hen converted back to rapid aifb.  Started on Amiodarone IV, had periods of converting from afib to Sinus, MD added digoxin and now is rate controlled in the 70s.    EP PEDRITO Montiel educated patient on CardioNet system x 2 weeks outpatient.  Patient is also to continue coumadin 3mg po qhs and follow up with cardiology office by Thursday for weekly PT/INR.  Patient is flying home on Wednesday and is aware of plan of care.  Wife at side.       [facility]  Revealed  CAD     CT of Chest  IMPRESSION:   1.  No pulmonary embolism.  2.  Diffuse lytic osseous lesions with age indeterminant mild to moderate   compression deformity of the left T12 vertebral body.      TTE   Summary:   1. Normal biventricular systolic function. Visually estimated LVEF =   60-65%.   2. No significant valvular abnormalities.   3. No intracardiac thrombus or intracardiac shunt present.   4. No significant pericardial effusion.   5. Mild, non-mobile atheroma in the thoracic aorta.   6. Successful synchronized DCCV to sinus rhythm with 150 Joules.   7. ** No prior echocardiograms available for comparison.      Hemonc: Dr. Aman Hummel (2017 09:26)    PMH  Atrial fibrillation  No h/o HF  No pertinent family history in first degree relatives  Handoff  MEWS Score  Multiple myeloma, remission status unspecified  HTN (hypertension)  Atrial fibrillation with RVR  Essential hypertension  NSVT (nonsustained ventricular tachycardia)  Atrial fibrillation with RVR  No significant past surgical history  HEART PALP      REVIEW OF SYSTEMS    General: Denies fever, chills, pain, no discomfort  	  Respiratory and Thorax:  Denies cough, sob, or any discomfort  	  Cardiovascular:  Denies chest pain, palpitations or any discomfort	    Gastrointestinal:  Denies n/v/d, constipation, or any discomfort    Genitourinary:  Denies frequency, burning, or pain    Musculoskeletal:  Denies joint pain, swelling, or any discomfort     Neurological:  Denies headache, dizziness blurred vision, numbing or tingling    Psychiatric:  Denies sadness or depression    Hematology  Christiano bleeding o swelling    Allergic/Immunologic:	  MEDICATIONS:  losartan 100milliGRAM(s) Oral daily  hydrochlorothiazide   Tablet 25milliGRAM(s) Oral daily  diltiazem   CD 120milliGRAM(s) Oral daily  metoprolol Injectable 5milliGRAM(s) IV Push every 6 hours PRN  amiodarone    Tablet 400milliGRAM(s) Oral every 8 hours  digoxin     Tablet 0.25milliGRAM(s) Oral daily  pantoprazole    Tablet 40milliGRAM(s) Oral before breakfast  docusate sodium 100milliGRAM(s) Oral three times a day  polyethylene glycol 3350 17Gram(s) Oral daily  senna 2Tablet(s) Oral at bedtime  insulin lispro (HumaLOG) corrective regimen sliding scale  SubCutaneous before breakfast  dextrose Gel 1Dose(s) Oral once PRN  dextrose 50% Injectable 12.5Gram(s) IV Push once  dextrose 50% Injectable 25Gram(s) IV Push once  dextrose 50% Injectable 25Gram(s) IV Push once  glucagon  Injectable 1milliGRAM(s) IntraMuscular once PRN  enoxaparin Injectable 70milliGRAM(s) SubCutaneous every 12 hours  pyridoxine 50milliGRAM(s) Oral daily  artificial  tears Solution 1Drop(s) Both EYES two times a day  sodium chloride 0.9%. 1000milliLiter(s) IV Continuous <Continuous>  dextrose 5%. 1000milliLiter(s) IV Continuous <Continuous>        PHYSICAL EXAM:    T(C): 37.1, Max: 37.2 (06-23 @ 22:11)  HR: 69 (66 - 72)  BP: 128/78 (118/68 - 128/78)  RR: 17 (16 - 17)  SpO2: 98% (96% - 98%)  Wt(kg): --    I&O's Summary    I & Os for current day (as of 2017 16:03)  =============================================  IN: 200 ml / OUT: 900 ml / NET: -700 ml      Daily     Daily Weight in k.1 (2017 06:44)    Appearance: Normal	  HEENT:   Normal oral mucosa, PERRL, EOMI	  Lymphatic: No lymphadenopathy  Cardiovascular: Normal S1 S2, No JVD, No murmurs, No edema  Respiratory:  RR wnl for rate and rhythm, color pink	  Psychiatry: A & O x 3, Mood & affect appropriate  Gastrointestinal:  Soft, Non-tender, + BS	  Skin: No rashes, No ecchymoses, No cyanosis  Neurologic: Non-focal  Extremities: Normal range of motion, No clubbing, cyanosis or edema  Vascular: Peripheral pulses palpable 2+ bilaterally      TELEMETRY: 	Afib, 3 beat run v tacc, going home on several cardiac medications amio and dig. Also being discharged on CardioNet, education provided by EP service.                 9.9    2.4   )-----------( 275      ( 2017 07:36 )             28.6     06-24    139  |  102  |  13.0  ----------------------------<  86  3.9   |  25.0  |  0.93    Ca    7.8<L>      2017 07:36  Phos  2.9     06-24  Mg     2.0     06-24    Assessment and Plan:   · Assessment		  61y Male with history of hypertension, MM (on revlimid & coumadin x 14 days) presenting with chest pain, SOB and palpitations. Patient has had difficulty sleeping at night due to palp. Presents with chest pain, right sided, no associated n/v/d. Last night had left sided CP. Denies light headedness/dizziness, fevers/chills, abdominal pain, n/v, diarrhea/constipation, dysuria or increased urinary frequency. Previous angiogram 5 years prior noted to be normal per patient with no PCI/CABG. Noted to be in New onset afib with RVR. Pt didnt revert after 3 doses of  cardizem IVP, was started on Cardizem gtt & given Lovenox x 1 dose. INR 1.2, last INR check was 10 days ago, unsure of the result.     New onset afib with RVR- ?revlimid side effect-s/p DCCV which failed, on cardizem PO & amio PO, s/p Amio gtt, nreverted back to Afib with RVR but became hypotensive on Cardizem gtt,  now on Dig PO, Afib hard to control, S/P LHC without PCI no CAD, CTA -ve for PEEcho with EF 65%, no DD noted, c/w AC theraptic,  TSH wnl.   Plans to return to [place] early next week. Cardionet monitoring system & instructions (written & verbal) provided to pt. pt was counseled to establish care w/ cardiologist in [place] [facility].   Continue amiodarone - pt will need LFTs & TFTs checked q 6 months while on amio and PFTs & fundoscopic exam annually.     HTN- c/w losartan, HCTZ  DM- hold metformin/ glimeperide, ISS, f/u FS  GERD- c/w PPI  Afib:  Continue, Amiodarone dig, and Cardizem  AC: coumadin therapeutic   Follow up with cardiologist on Thursday in [place].    INR check monday,  to follow up. HPI:  61y Male with history of hypertension, MM (on revlimid & coumadin x 14 days) presenting with chest pain, SOB and palpitations. Patient has had difficulty sleeping at night due to palp. Presents with chest pain, right sided, no associated n/v/d. Last night had left sided CP. Denies light headedness/dizziness, fevers/chills, abdominal pain, n/v, diarrhea/constipation, dysuria or increased urinary frequency. Previous angiogram 5 years prior noted to be normal per patient with no PCI/CABG. Noted to be in New onset afib with RVR. Pt was started on Cardizem gtt & given Lovenox x 1 dose. INR 1.2, last INR check was 10 days ago, unsure of the result. Going for CV today.   Rapid afib continued underwent cardioversion converted to SR but hen converted back to rapid aifb.  Started on Amiodarone IV, had periods of converting from afib to Sinus, MD added digoxin and now is rate controlled in the 70s.    EP PEDRITO Montiel educated patient on CardioNet system x 2 weeks outpatient.  Patient is also to continue coumadin 3mg po qhs and follow up with cardiology office by Thursday for weekly PT/INR.  Patient is flying home on Wednesday and is aware of plan of care.  Wife at side.       [facility]  Revealed  CAD     CT of Chest  IMPRESSION:   1.  No pulmonary embolism.  2.  Diffuse lytic osseous lesions with age indeterminant mild to moderate   compression deformity of the left T12 vertebral body.      TTE   Summary:   1. Normal biventricular systolic function. Visually estimated LVEF =   60-65%.   2. No significant valvular abnormalities.   3. No intracardiac thrombus or intracardiac shunt present.   4. No significant pericardial effusion.   5. Mild, non-mobile atheroma in the thoracic aorta.   6. Successful synchronized DCCV to sinus rhythm with 150 Joules.   7. ** No prior echocardiograms available for comparison.      Hemonc: Dr. Aman Hummel (2017 09:26)    PMH  Atrial fibrillation  No h/o HF  No pertinent family history in first degree relatives  Handoff  MEWS Score  Multiple myeloma, remission status unspecified  HTN (hypertension)  Atrial fibrillation with RVR  Essential hypertension  NSVT (nonsustained ventricular tachycardia)  Atrial fibrillation with RVR  No significant past surgical history  HEART PALP      REVIEW OF SYSTEMS    General: Denies fever, chills, pain, no discomfort  	  Respiratory and Thorax:  Denies cough, sob, or any discomfort  	  Cardiovascular:  Denies chest pain, palpitations or any discomfort	    Gastrointestinal:  Denies n/v/d, constipation, or any discomfort    Genitourinary:  Denies frequency, burning, or pain    Musculoskeletal:  Denies joint pain, swelling, or any discomfort     Neurological:  Denies headache, dizziness blurred vision, numbing or tingling    Psychiatric:  Denies sadness or depression    Hematology  Christiano bleeding o swelling    Allergic/Immunologic:	  MEDICATIONS:  losartan 100milliGRAM(s) Oral daily  hydrochlorothiazide   Tablet 25milliGRAM(s) Oral daily  diltiazem   CD 120milliGRAM(s) Oral daily  metoprolol Injectable 5milliGRAM(s) IV Push every 6 hours PRN  amiodarone    Tablet 400milliGRAM(s) Oral every 8 hours  digoxin     Tablet 0.25milliGRAM(s) Oral daily  pantoprazole    Tablet 40milliGRAM(s) Oral before breakfast  docusate sodium 100milliGRAM(s) Oral three times a day  polyethylene glycol 3350 17Gram(s) Oral daily  senna 2Tablet(s) Oral at bedtime  insulin lispro (HumaLOG) corrective regimen sliding scale  SubCutaneous before breakfast  dextrose Gel 1Dose(s) Oral once PRN  dextrose 50% Injectable 12.5Gram(s) IV Push once  dextrose 50% Injectable 25Gram(s) IV Push once  dextrose 50% Injectable 25Gram(s) IV Push once  glucagon  Injectable 1milliGRAM(s) IntraMuscular once PRN  enoxaparin Injectable 70milliGRAM(s) SubCutaneous every 12 hours  pyridoxine 50milliGRAM(s) Oral daily  artificial  tears Solution 1Drop(s) Both EYES two times a day  sodium chloride 0.9%. 1000milliLiter(s) IV Continuous <Continuous>  dextrose 5%. 1000milliLiter(s) IV Continuous <Continuous>        PHYSICAL EXAM:    T(C): 37.1, Max: 37.2 (06-23 @ 22:11)  HR: 69 (66 - 72)  BP: 128/78 (118/68 - 128/78)  RR: 17 (16 - 17)  SpO2: 98% (96% - 98%)  Wt(kg): --    I&O's Summary    I & Os for current day (as of 2017 16:03)  =============================================  IN: 200 ml / OUT: 900 ml / NET: -700 ml      Daily     Daily Weight in k.1 (2017 06:44)    Appearance: Normal	  HEENT:   Normal oral mucosa, PERRL, EOMI	  Lymphatic: No lymphadenopathy  Cardiovascular: Normal S1 S2, No JVD, No murmurs, No edema  Respiratory:  RR wnl for rate and rhythm, color pink	  Psychiatry: A & O x 3, Mood & affect appropriate  Gastrointestinal:  Soft, Non-tender, + BS	  Skin: No rashes, No ecchymoses, No cyanosis  Neurologic: Non-focal  Extremities: Normal range of motion, No clubbing, cyanosis or edema  Vascular: Peripheral pulses palpable 2+ bilaterally      TELEMETRY: 	Afib, 3 beat run v tacc, going home on several cardiac medications amio and dig. Also being discharged on CardioNet, education provided by EP service.                 9.9    2.4   )-----------( 275      ( 2017 07:36 )             28.6     06-24    139  |  102  |  13.0  ----------------------------<  86  3.9   |  25.0  |  0.93    Ca    7.8<L>      2017 07:36  Phos  2.9     06-24  Mg     2.0     06-24    Assessment and Plan:   · Assessment		  61y Male with history of hypertension, MM (on revlimid & coumadin x 14 days) presenting with chest pain, SOB and palpitations. Patient has had difficulty sleeping at night due to palp. Presents with chest pain, right sided, no associated n/v/d. Last night had left sided CP. Denies light headedness/dizziness, fevers/chills, abdominal pain, n/v, diarrhea/constipation, dysuria or increased urinary frequency. Previous angiogram 5 years prior noted to be normal per patient with no PCI/CABG. Noted to be in New onset afib with RVR. Pt didnt revert after 3 doses of  cardizem IVP, was started on Cardizem gtt & given Lovenox x 1 dose. INR 1.2, last INR check was 10 days ago, unsure of the result.     New onset afib with RVR- ?revlimid side effect-s/p DCCV which failed, on cardizem PO & amio PO, s/p Amio gtt, nreverted back to Afib with RVR but became hypotensive on Cardizem gtt,  now on Dig PO, Afib hard to control, S/P LHC without PCI no CAD, CTA -ve for PEEcho with EF 65%, no DD noted, c/w AC theraptic,  TSH wnl.   Plans to return to [place] early next week. Cardionet monitoring system & instructions (written & verbal) provided to pt. pt was counseled to establish care w/ cardiologist in [place] [facility].   Continue amiodarone - pt will need LFTs & TFTs checked q 6 months while on amio and PFTs & fundoscopic exam annually.     HTN- c/w losartan, HCTZ  DM- hold metformin/ glimeperide, ISS, f/u FS  GERD- c/w PPI  Afib:  Continue, Amiodarone dig, and Cardizem  AC: coumadin therapeutic   Follow up with cardiologist on Thursday in [place].    INR check monday,  to set up

## 2017-06-24 NOTE — DISCHARGE NOTE ADULT - CARE PROVIDER_API CALL
SHIRLENE,   [facility] [place]  Address: [address], [place] [address]  Phone: [phone]  Phone: (   )    -  Fax: (   )    -

## 2017-06-24 NOTE — DISCHARGE NOTE ADULT - PROVIDER TOKENS
FREE:[LAST:[HR],PHONE:[(   )    -],FAX:[(   )    -],ADDRESS:[[facility] [place]  Address: [address] , [place] [address]  Phone: [phone]]]

## 2017-06-25 RX ORDER — ALPRAZOLAM 0.25 MG
1 TABLET ORAL
Qty: 15 | Refills: 0 | OUTPATIENT
Start: 2017-06-25

## 2019-09-10 NOTE — CONSULT NOTE ADULT - CONSULT REQUESTED DATE/TIME
21-Jun-2017 19:53 detects potentially life-threatening tumours among asymptomatic men at a stage when lesions are curable, and if the balance of evidence demonstrates that the prospect of benefit outweighs the potential for harm. 0,46,48 The key dilemma is that, although most cancers detected by screening are clinically confined to the prostate and hence potentially curable, current screening tests cannot differentiate between the majority of screen-detected cancers that have low biological likelihood of progression (for which radical treatments would probably be unnecessary) from those with aggressive potential, in whom early radical treatment might be beneficial.6,25 Seventy-one years since Richs publication, real progress would be made if we could identify biological factors that predict those localized prostate cancers destined to progress and result in clinical disease and mortality. Such factors would have to be extremely strongly associated with progression, however, to be potentially useful in a clinical setting. \"    Commentary: Prostate cancer is omnipresent, but should we screen for it?    Ignacio Perla 27  International Journal of Epidemiology, Volume 36, Issue 2, 1 April 2007, Pages 278-281, https://doi.org/10.1093/ije/lnz970

## 2021-06-10 NOTE — DISCHARGE NOTE ADULT - MEDICATION SUMMARY - MEDICATIONS TO TAKE
General I will START or STAY ON the medications listed below when I get home from the hospital:    losartan  --  by mouth   -- Indication: For HTN (hypertension)    amiodarone 200 mg oral tablet  -- 2 tab(s) by mouth every 8 hours  -- Indication: For AF    dilTIAZem 120 mg/24 hours oral capsule, extended release  -- 1 cap(s) by mouth once a day  -- Indication: For AF    digoxin 250 mcg (0.25 mg) oral tablet  -- 1 tab(s) by mouth once a day  -- Indication: For AF    Coumadin 4 mg oral tablet  -- 1 tab(s) by mouth once a day until INR check by PMD  -- Do not take this drug if you are pregnant.  It is very important that you take or use this exactly as directed.  Do not skip doses or discontinue unless directed by your doctor.  Obtain medical advice before taking any non-prescription drugs as some may affect the action of this medication.    -- Indication: For AF    Glucophage  --  by mouth   -- Indication: For dm    hydroCHLOROthiazide  --  by mouth   -- Indication: For HTN (hypertension)    Revlimid  --  by mouth   -- Indication: For MM    ocular lubricant ophthalmic solution  -- 1 drop(s) to each affected eye 2 times a day  -- Indication: For tears    pantoprazole 40 mg oral delayed release tablet  -- 1 tab(s) by mouth once a day (before a meal)  -- Indication: For gerd    pyridoxine 50 mg oral tablet  -- 1 tab(s) by mouth once a day  -- Indication: For Nutrition - - -
